# Patient Record
Sex: MALE | Race: WHITE | NOT HISPANIC OR LATINO | Employment: OTHER | URBAN - METROPOLITAN AREA
[De-identification: names, ages, dates, MRNs, and addresses within clinical notes are randomized per-mention and may not be internally consistent; named-entity substitution may affect disease eponyms.]

---

## 2023-02-24 LAB
ALBUMIN (G/DL) IN SER/PLAS: 4.2 G/DL (ref 3.4–5)
ANION GAP IN SER/PLAS: 15 MMOL/L (ref 10–20)
APPEARANCE, URINE: CLEAR
BILIRUBIN, URINE: NEGATIVE
BLOOD, URINE: ABNORMAL
CALCIUM (MG/DL) IN SER/PLAS: 9.1 MG/DL (ref 8.6–10.3)
CARBON DIOXIDE, TOTAL (MMOL/L) IN SER/PLAS: 25 MMOL/L (ref 21–32)
CHLORIDE (MMOL/L) IN SER/PLAS: 103 MMOL/L (ref 98–107)
COLOR, URINE: YELLOW
CREATININE (MG/DL) IN SER/PLAS: 2.72 MG/DL (ref 0.5–1.3)
GFR MALE: 24 ML/MIN/1.73M2
GLUCOSE (MG/DL) IN SER/PLAS: 118 MG/DL (ref 74–99)
GLUCOSE, URINE: ABNORMAL MG/DL
KETONES, URINE: NEGATIVE MG/DL
LEUKOCYTE ESTERASE, URINE: NEGATIVE
NITRITE, URINE: NEGATIVE
PH, URINE: 5 (ref 5–8)
PHOSPHATE (MG/DL) IN SER/PLAS: 3.6 MG/DL (ref 2.5–4.9)
POTASSIUM (MMOL/L) IN SER/PLAS: 4.4 MMOL/L (ref 3.5–5.3)
PROTEIN, URINE: ABNORMAL MG/DL
RBC, URINE: <1 /HPF (ref 0–5)
SODIUM (MMOL/L) IN SER/PLAS: 139 MMOL/L (ref 136–145)
SPECIFIC GRAVITY, URINE: 1.02 (ref 1–1.03)
UREA NITROGEN (MG/DL) IN SER/PLAS: 46 MG/DL (ref 6–23)
UROBILINOGEN, URINE: <2 MG/DL (ref 0–1.9)
WBC, URINE: <1 /HPF (ref 0–5)

## 2023-02-25 LAB
ESTIMATED AVERAGE GLUCOSE FOR HBA1C: 157 MG/DL
HEMOGLOBIN A1C/HEMOGLOBIN TOTAL IN BLOOD: 7.1 %
PARATHYRIN INTACT (PG/ML) IN SER/PLAS: 114.9 PG/ML (ref 18.5–88)

## 2023-03-08 ENCOUNTER — TELEPHONE (OUTPATIENT)
Dept: PRIMARY CARE | Facility: CLINIC | Age: 73
End: 2023-03-08
Payer: MEDICARE

## 2023-03-09 DIAGNOSIS — U07.1 COVID-19: Primary | ICD-10-CM

## 2023-03-09 RX ORDER — DRONEDARONE 400 MG/1
400 TABLET, FILM COATED ORAL
COMMUNITY
Start: 2022-05-19 | End: 2023-11-08 | Stop reason: WASHOUT

## 2023-03-09 RX ORDER — APIXABAN 5 MG/1
1 TABLET, FILM COATED ORAL 2 TIMES DAILY
COMMUNITY
Start: 2022-02-24 | End: 2024-02-27 | Stop reason: SDUPTHER

## 2023-03-09 RX ORDER — DULAGLUTIDE 1.5 MG/.5ML
1.5 INJECTION, SOLUTION SUBCUTANEOUS
COMMUNITY
Start: 2020-03-05 | End: 2023-12-01 | Stop reason: SDUPTHER

## 2023-03-09 RX ORDER — EZETIMIBE 10 MG/1
1 TABLET ORAL DAILY
COMMUNITY
Start: 2020-11-18

## 2023-03-09 RX ORDER — ASCORBIC ACID 250 MG
1 TABLET ORAL DAILY
COMMUNITY
End: 2023-10-13 | Stop reason: ALTCHOICE

## 2023-03-09 RX ORDER — DAPAGLIFLOZIN 10 MG/1
1 TABLET, FILM COATED ORAL DAILY
COMMUNITY
Start: 2021-07-13 | End: 2024-02-15 | Stop reason: SDUPTHER

## 2023-03-09 RX ORDER — PERPHENAZINE 16 MG
800 TABLET ORAL 2 TIMES DAILY
COMMUNITY
Start: 2019-05-13

## 2023-03-10 DIAGNOSIS — U07.1 COVID-19: ICD-10-CM

## 2023-03-10 RX ORDER — MOLNUPIRAVIR 200 MG/1
800 CAPSULE ORAL DAILY
Qty: 20 CAPSULE | Refills: 0 | Status: SHIPPED | OUTPATIENT
Start: 2023-03-10 | End: 2023-03-10 | Stop reason: SDUPTHER

## 2023-03-10 RX ORDER — MOLNUPIRAVIR 200 MG/1
800 CAPSULE ORAL 2 TIMES DAILY
Qty: 40 CAPSULE | Refills: 0 | Status: SHIPPED | OUTPATIENT
Start: 2023-03-10 | End: 2023-03-15

## 2023-03-10 NOTE — PROGRESS NOTES
Reports positive COVID-19 test on 3/8/2023, will issue prescription for lagevrio 800 mg daily x 5 days.

## 2023-04-05 PROBLEM — I10 BENIGN ESSENTIAL HYPERTENSION: Status: ACTIVE | Noted: 2023-04-05

## 2023-04-05 PROBLEM — E66.9 OBESITY (BMI 30-39.9): Status: ACTIVE | Noted: 2023-04-05

## 2023-04-05 PROBLEM — E66.01 MORBID OBESITY (MULTI): Status: ACTIVE | Noted: 2023-04-05

## 2023-04-05 PROBLEM — I50.32 CHRONIC DIASTOLIC HEART FAILURE, NYHA CLASS 2 (MULTI): Status: ACTIVE | Noted: 2023-04-05

## 2023-04-05 PROBLEM — E78.5 HYPERLIPEMIA: Status: ACTIVE | Noted: 2023-04-05

## 2023-04-05 PROBLEM — I87.8 VENOUS STASIS OF LOWER EXTREMITY: Status: ACTIVE | Noted: 2023-04-05

## 2023-04-05 PROBLEM — I25.10 ATHEROSCLEROSIS OF NATIVE CORONARY ARTERY OF NATIVE HEART WITHOUT ANGINA PECTORIS: Status: ACTIVE | Noted: 2023-04-05

## 2023-04-05 PROBLEM — N18.32 CHRONIC KIDNEY DISEASE (CKD) STAGE G3B/A1, MODERATELY DECREASED GLOMERULAR FILTRATION RATE (GFR) BETWEEN 30-44 ML/MIN/1.73 SQUARE METER AND ALBUMINURIA CREATININE RATIO LESS THAN 30 MG/G (CMS* (MULTI): Status: ACTIVE | Noted: 2023-04-05

## 2023-04-05 PROBLEM — E11.21 TYPE 2 DIABETES WITH NEPHROPATHY (MULTI): Status: ACTIVE | Noted: 2023-04-05

## 2023-04-05 PROBLEM — I51.89 RIGHT VENTRICULAR SYSTOLIC DYSFUNCTION: Status: ACTIVE | Noted: 2023-04-05

## 2023-04-05 PROBLEM — E11.9 DIABETES TYPE 2, CONTROLLED (MULTI): Status: ACTIVE | Noted: 2023-04-05

## 2023-04-05 PROBLEM — Z86.59 HISTORY OF DEPRESSION: Status: ACTIVE | Noted: 2023-04-05

## 2023-04-05 PROBLEM — G47.33 OSA TREATED WITH BIPAP: Status: ACTIVE | Noted: 2023-04-05

## 2023-04-05 PROBLEM — I48.0 PAROXYSMAL ATRIAL FIBRILLATION (MULTI): Status: ACTIVE | Noted: 2023-04-05

## 2023-04-05 RX ORDER — ROSUVASTATIN CALCIUM 40 MG/1
1 TABLET, COATED ORAL DAILY
COMMUNITY
Start: 2016-02-11 | End: 2023-11-05

## 2023-04-05 RX ORDER — LOSARTAN POTASSIUM 50 MG/1
25 TABLET ORAL DAILY
COMMUNITY
Start: 2019-04-02 | End: 2023-11-08 | Stop reason: WASHOUT

## 2023-04-05 RX ORDER — OMEGA-3-ACID ETHYL ESTERS 1 G/1
1 CAPSULE, LIQUID FILLED ORAL 2 TIMES DAILY
COMMUNITY
Start: 2022-02-24

## 2023-04-05 RX ORDER — TURMERIC ROOT EXTRACT 500 MG
TABLET ORAL
COMMUNITY

## 2023-04-05 RX ORDER — SODIUM BICARBONATE 650 MG/1
1300 TABLET ORAL 2 TIMES DAILY
COMMUNITY
Start: 2022-12-30 | End: 2023-12-06 | Stop reason: WASHOUT

## 2023-04-05 RX ORDER — FAMOTIDINE 20 MG/1
1 TABLET, FILM COATED ORAL EVERY 12 HOURS
COMMUNITY
Start: 2021-07-13

## 2023-04-05 RX ORDER — PEN NEEDLE, DIABETIC 31 GX5/16"
NEEDLE, DISPOSABLE MISCELLANEOUS
COMMUNITY
Start: 2022-11-26 | End: 2023-12-19 | Stop reason: SDUPTHER

## 2023-04-05 RX ORDER — GLIMEPIRIDE 4 MG/1
1 TABLET ORAL 2 TIMES DAILY
COMMUNITY
Start: 2015-07-29 | End: 2024-05-29 | Stop reason: SDUPTHER

## 2023-04-05 RX ORDER — ACETAMINOPHEN 160 MG/5ML
SUSPENSION, ORAL (FINAL DOSE FORM) ORAL
COMMUNITY
Start: 2019-04-02

## 2023-04-05 RX ORDER — INSULIN DEGLUDEC 100 U/ML
INJECTION, SOLUTION SUBCUTANEOUS
COMMUNITY
Start: 2019-10-25 | End: 2023-12-06 | Stop reason: ALTCHOICE

## 2023-04-05 RX ORDER — CEPHRADINE 500 MG
1 CAPSULE ORAL DAILY
COMMUNITY
Start: 2019-05-13

## 2023-04-05 RX ORDER — NITROGLYCERIN 0.4 MG/1
0.4 TABLET SUBLINGUAL EVERY 5 MIN PRN
COMMUNITY
Start: 2021-08-30

## 2023-04-05 RX ORDER — GLUC/MSM/COLGN2/HYAL/ANTIARTH3 375-375-20
1 TABLET ORAL DAILY
COMMUNITY

## 2023-04-05 RX ORDER — FUROSEMIDE 20 MG/1
20 TABLET ORAL
COMMUNITY
Start: 2018-10-29 | End: 2023-10-13 | Stop reason: ALTCHOICE

## 2023-04-06 ENCOUNTER — OFFICE VISIT (OUTPATIENT)
Dept: PRIMARY CARE | Facility: CLINIC | Age: 73
End: 2023-04-06
Payer: MEDICARE

## 2023-04-06 VITALS
TEMPERATURE: 97.1 F | SYSTOLIC BLOOD PRESSURE: 160 MMHG | DIASTOLIC BLOOD PRESSURE: 87 MMHG | WEIGHT: 257 LBS | OXYGEN SATURATION: 97 % | HEIGHT: 70 IN | BODY MASS INDEX: 36.79 KG/M2 | HEART RATE: 64 BPM

## 2023-04-06 DIAGNOSIS — I10 BENIGN ESSENTIAL HYPERTENSION: Primary | ICD-10-CM

## 2023-04-06 DIAGNOSIS — Z79.4 CONTROLLED TYPE 2 DIABETES MELLITUS WITHOUT COMPLICATION, WITH LONG-TERM CURRENT USE OF INSULIN (MULTI): ICD-10-CM

## 2023-04-06 DIAGNOSIS — E11.9 CONTROLLED TYPE 2 DIABETES MELLITUS WITHOUT COMPLICATION, WITH LONG-TERM CURRENT USE OF INSULIN (MULTI): ICD-10-CM

## 2023-04-06 DIAGNOSIS — E78.2 MIXED HYPERLIPIDEMIA: ICD-10-CM

## 2023-04-06 PROCEDURE — 3077F SYST BP >= 140 MM HG: CPT | Performed by: STUDENT IN AN ORGANIZED HEALTH CARE EDUCATION/TRAINING PROGRAM

## 2023-04-06 PROCEDURE — 99212 OFFICE O/P EST SF 10 MIN: CPT | Performed by: STUDENT IN AN ORGANIZED HEALTH CARE EDUCATION/TRAINING PROGRAM

## 2023-04-06 PROCEDURE — 1160F RVW MEDS BY RX/DR IN RCRD: CPT | Performed by: STUDENT IN AN ORGANIZED HEALTH CARE EDUCATION/TRAINING PROGRAM

## 2023-04-06 PROCEDURE — 3079F DIAST BP 80-89 MM HG: CPT | Performed by: STUDENT IN AN ORGANIZED HEALTH CARE EDUCATION/TRAINING PROGRAM

## 2023-04-06 PROCEDURE — 1159F MED LIST DOCD IN RCRD: CPT | Performed by: STUDENT IN AN ORGANIZED HEALTH CARE EDUCATION/TRAINING PROGRAM

## 2023-04-06 PROCEDURE — 3051F HG A1C>EQUAL 7.0%<8.0%: CPT | Performed by: STUDENT IN AN ORGANIZED HEALTH CARE EDUCATION/TRAINING PROGRAM

## 2023-04-06 PROCEDURE — 4010F ACE/ARB THERAPY RXD/TAKEN: CPT | Performed by: STUDENT IN AN ORGANIZED HEALTH CARE EDUCATION/TRAINING PROGRAM

## 2023-04-06 ASSESSMENT — ENCOUNTER SYMPTOMS
WHEEZING: 0
VOMITING: 0
DIARRHEA: 0
CONFUSION: 0
CONSTIPATION: 0
CHILLS: 0
UNEXPECTED WEIGHT CHANGE: 0
FATIGUE: 0
COUGH: 0
PALPITATIONS: 0
MUSCULOSKELETAL NEGATIVE: 1
HEADACHES: 0
SHORTNESS OF BREATH: 0
DIZZINESS: 0
ABDOMINAL PAIN: 0
FEVER: 0
NAUSEA: 0
COLOR CHANGE: 0

## 2023-04-06 ASSESSMENT — PATIENT HEALTH QUESTIONNAIRE - PHQ9
SUM OF ALL RESPONSES TO PHQ9 QUESTIONS 1 AND 2: 0
2. FEELING DOWN, DEPRESSED OR HOPELESS: NOT AT ALL
1. LITTLE INTEREST OR PLEASURE IN DOING THINGS: NOT AT ALL

## 2023-04-06 ASSESSMENT — PAIN SCALES - GENERAL: PAINLEVEL: 0-NO PAIN

## 2023-04-06 NOTE — PROGRESS NOTES
"Subjective   Patient ID: Waldemar Trujillo is a 72 y.o. male who presents for New Patient Visit (Pt is here for NPV, used to see , would like to continue care. Pt has no other concern today.).    HPI   Here to estb care and also for chronic care FU. Reports he is doing okay, no acute concerns today; he is not sure which meds he gets from us and if he needs any refills today.   His home BG running from . He is on trulicity, tresiba & glimepiride, currently managed by endo. Also sees cards for pAF and others. Also follows w/ nephro for CKD. He gtes most of the meds from other specialists and is UTD on them. He presented w/ elevated BP today, reports prev at goal; takes losartan 50 mg managed by cards.     Review of Systems   Constitutional:  Negative for chills, fatigue, fever and unexpected weight change.   HENT: Negative.     Respiratory:  Negative for cough, shortness of breath and wheezing.    Cardiovascular:  Negative for chest pain, palpitations and leg swelling.   Gastrointestinal:  Negative for abdominal pain, constipation, diarrhea, nausea and vomiting.   Musculoskeletal: Negative.    Skin:  Negative for color change and rash.   Neurological:  Negative for dizziness and headaches.   Psychiatric/Behavioral:  Negative for behavioral problems and confusion.        Objective   /87 (BP Location: Left arm, Patient Position: Sitting)   Pulse 64   Temp 36.2 °C (97.1 °F)   Ht 1.778 m (5' 10\")   Wt 117 kg (257 lb)   SpO2 97%   BMI 36.88 kg/m²     Physical Exam  Vitals and nursing note reviewed.   Constitutional:       Appearance: Normal appearance. He is obese.   Cardiovascular:      Rate and Rhythm: Normal rate and regular rhythm.      Pulses: Normal pulses.      Heart sounds: Normal heart sounds.   Pulmonary:      Effort: Pulmonary effort is normal. No respiratory distress.      Breath sounds: Normal breath sounds.   Abdominal:      General: Abdomen is flat. Bowel sounds are normal.      " Palpations: Abdomen is soft.   Musculoskeletal:         General: Normal range of motion.   Neurological:      General: No focal deficit present.      Mental Status: He is alert and oriented to person, place, and time.   Psychiatric:         Mood and Affect: Mood normal.         Behavior: Behavior normal.       Assessment/Plan   He is here to estb care and also for FU visit. Overall doing okay, no major concerns today and is clinically & vitally stable except elevated BP. Rec to keep BP logs and call us with readings in 2 wks. Declined to come for nurse visit for BP check. Advise to get in touch w/ cards for further mgmt. Rec dash diety and daily exercises. Rec to d/ diabetic meds w/ endo at NOV; may benefit from stopping or cutting down on glimepiride d/t low BG. Advise to cont seeing all other specialist as usual. Okay to cont seeing BSP if he comes back later to the practice.  Plan as follows     Problem List Items Addressed This Visit          Circulatory    Benign essential hypertension - Primary       Endocrine/Metabolic    Diabetes type 2, controlled (CMS/HCC)       Other    Hyperlipemia     Mariano Landers MD    [place]

## 2023-06-01 LAB
ALBUMIN (G/DL) IN SER/PLAS: 4.2 G/DL (ref 3.4–5)
ANION GAP IN SER/PLAS: 11 MMOL/L (ref 10–20)
APPEARANCE, URINE: CLEAR
BILIRUBIN, URINE: NEGATIVE
BLOOD, URINE: ABNORMAL
CALCIDIOL (25 OH VITAMIN D3) (NG/ML) IN SER/PLAS: 93 NG/ML
CALCIUM (MG/DL) IN SER/PLAS: 9.2 MG/DL (ref 8.6–10.3)
CARBON DIOXIDE, TOTAL (MMOL/L) IN SER/PLAS: 24 MMOL/L (ref 21–32)
CHLORIDE (MMOL/L) IN SER/PLAS: 109 MMOL/L (ref 98–107)
COLOR, URINE: YELLOW
CREATININE (MG/DL) IN SER/PLAS: 1.89 MG/DL (ref 0.5–1.3)
CREATININE (MG/DL) IN URINE: 73 MG/DL (ref 20–370)
FINE GRANULAR CASTS, URINE: ABNORMAL /LPF
GFR MALE: 37 ML/MIN/1.73M2
GLUCOSE (MG/DL) IN SER/PLAS: 108 MG/DL (ref 74–99)
GLUCOSE, URINE: ABNORMAL MG/DL
KETONES, URINE: NEGATIVE MG/DL
LEUKOCYTE ESTERASE, URINE: NEGATIVE
NITRITE, URINE: NEGATIVE
PARATHYRIN INTACT (PG/ML) IN SER/PLAS: 76.1 PG/ML (ref 18.5–88)
PH, URINE: 5 (ref 5–8)
PHOSPHATE (MG/DL) IN SER/PLAS: 4 MG/DL (ref 2.5–4.9)
POTASSIUM (MMOL/L) IN SER/PLAS: 4.4 MMOL/L (ref 3.5–5.3)
PROTEIN (MG/DL) IN URINE: 91 MG/DL (ref 5–25)
PROTEIN, URINE: ABNORMAL MG/DL
PROTEIN/CREATININE (MG/MG) IN URINE: 1.25 MG/MG CREAT (ref 0–0.17)
RBC, URINE: <1 /HPF (ref 0–5)
SODIUM (MMOL/L) IN SER/PLAS: 140 MMOL/L (ref 136–145)
SPECIFIC GRAVITY, URINE: 1.02 (ref 1–1.03)
SQUAMOUS EPITHELIAL CELLS, URINE: 1 /HPF
UREA NITROGEN (MG/DL) IN SER/PLAS: 30 MG/DL (ref 6–23)
UROBILINOGEN, URINE: <2 MG/DL (ref 0–1.9)
WBC, URINE: 1 /HPF (ref 0–5)

## 2023-10-06 PROBLEM — U07.1 POSITIVE SELF-ADMINISTERED ANTIGEN TEST FOR COVID-19: Status: ACTIVE | Noted: 2023-10-06

## 2023-10-06 RX ORDER — FUROSEMIDE 40 MG/1
40 TABLET ORAL DAILY
COMMUNITY
Start: 2018-10-29

## 2023-10-06 RX ORDER — MOLNUPIRAVIR 200 MG/1
CAPSULE ORAL 2 TIMES DAILY
COMMUNITY
Start: 2023-03-09 | End: 2023-11-08 | Stop reason: WASHOUT

## 2023-10-06 RX ORDER — PIOGLITAZONEHYDROCHLORIDE 45 MG/1
45 TABLET ORAL DAILY
COMMUNITY
Start: 2018-08-17 | End: 2023-11-08 | Stop reason: WASHOUT

## 2023-10-06 RX ORDER — HYDRALAZINE HYDROCHLORIDE 25 MG/1
25 TABLET, FILM COATED ORAL EVERY 12 HOURS
COMMUNITY
Start: 2019-05-13 | End: 2023-11-08 | Stop reason: WASHOUT

## 2023-10-06 RX ORDER — ASPIRIN 81 MG/1
81 TABLET ORAL DAILY
COMMUNITY
Start: 2016-09-12 | End: 2023-10-09 | Stop reason: ALTCHOICE

## 2023-10-06 RX ORDER — WARFARIN 1 MG/1
1 TABLET ORAL
COMMUNITY
End: 2023-10-09 | Stop reason: ALTCHOICE

## 2023-10-06 RX ORDER — SOTALOL HYDROCHLORIDE 80 MG/1
80 TABLET ORAL EVERY 12 HOURS
COMMUNITY
Start: 2018-08-17 | End: 2023-11-08 | Stop reason: WASHOUT

## 2023-10-06 RX ORDER — AMLODIPINE BESYLATE 5 MG/1
5 TABLET ORAL
COMMUNITY
Start: 2018-09-22 | End: 2023-10-13 | Stop reason: ALTCHOICE

## 2023-10-06 RX ORDER — MOMETASONE FUROATE 50 UG/1
2 SPRAY, METERED NASAL EVERY 24 HOURS
COMMUNITY
Start: 2019-11-09 | End: 2023-12-06 | Stop reason: WASHOUT

## 2023-10-06 RX ORDER — LIRAGLUTIDE 6 MG/ML
INJECTION SUBCUTANEOUS DAILY
COMMUNITY
Start: 2018-09-07 | End: 2023-10-13 | Stop reason: ALTCHOICE

## 2023-10-06 RX ORDER — INSULIN GLARGINE 300 U/ML
INJECTION, SOLUTION SUBCUTANEOUS
COMMUNITY
Start: 2023-08-02

## 2023-10-06 RX ORDER — CHLORTHALIDONE 25 MG/1
25 TABLET ORAL
COMMUNITY
Start: 2018-07-02 | End: 2023-11-08 | Stop reason: WASHOUT

## 2023-10-06 RX ORDER — RAMIPRIL 2.5 MG/1
2.5 CAPSULE ORAL
COMMUNITY
Start: 2018-08-10 | End: 2023-10-13 | Stop reason: ALTCHOICE

## 2023-10-09 ENCOUNTER — OFFICE VISIT (OUTPATIENT)
Dept: PRIMARY CARE | Facility: CLINIC | Age: 73
End: 2023-10-09
Payer: MEDICARE

## 2023-10-09 ENCOUNTER — LAB (OUTPATIENT)
Dept: LAB | Facility: LAB | Age: 73
End: 2023-10-09
Payer: MEDICARE

## 2023-10-09 VITALS
OXYGEN SATURATION: 97 % | TEMPERATURE: 97.4 F | DIASTOLIC BLOOD PRESSURE: 85 MMHG | WEIGHT: 256 LBS | HEART RATE: 69 BPM | SYSTOLIC BLOOD PRESSURE: 192 MMHG | HEIGHT: 70 IN | BODY MASS INDEX: 36.65 KG/M2 | RESPIRATION RATE: 16 BRPM

## 2023-10-09 DIAGNOSIS — I10 BENIGN ESSENTIAL HYPERTENSION: ICD-10-CM

## 2023-10-09 DIAGNOSIS — E66.01 MORBID OBESITY (MULTI): ICD-10-CM

## 2023-10-09 DIAGNOSIS — Z12.5 SCREENING FOR PROSTATE CANCER: ICD-10-CM

## 2023-10-09 DIAGNOSIS — I48.0 PAROXYSMAL ATRIAL FIBRILLATION (MULTI): ICD-10-CM

## 2023-10-09 DIAGNOSIS — Z00.00 ROUTINE GENERAL MEDICAL EXAMINATION AT HEALTH CARE FACILITY: Primary | ICD-10-CM

## 2023-10-09 DIAGNOSIS — E11.9 TYPE 2 DIABETES MELLITUS WITHOUT COMPLICATIONS (MULTI): ICD-10-CM

## 2023-10-09 DIAGNOSIS — N18.4 CHRONIC KIDNEY DISEASE, STAGE 4 (SEVERE) (MULTI): Primary | ICD-10-CM

## 2023-10-09 LAB
25(OH)D3 SERPL-MCNC: 95 NG/ML (ref 30–100)
ALBUMIN SERPL BCP-MCNC: 4.6 G/DL (ref 3.4–5)
ANION GAP SERPL CALC-SCNC: 13 MMOL/L (ref 10–20)
ANION GAP SERPL CALC-SCNC: 15 MMOL/L (ref 10–20)
APPEARANCE UR: CLEAR
BILIRUB UR STRIP.AUTO-MCNC: NEGATIVE MG/DL
BUN SERPL-MCNC: 31 MG/DL (ref 6–23)
BUN SERPL-MCNC: 32 MG/DL (ref 6–23)
CALCIUM SERPL-MCNC: 9.4 MG/DL (ref 8.6–10.3)
CALCIUM SERPL-MCNC: 9.4 MG/DL (ref 8.6–10.3)
CHLORIDE SERPL-SCNC: 109 MMOL/L (ref 98–107)
CHLORIDE SERPL-SCNC: 110 MMOL/L (ref 98–107)
CHOLEST SERPL-MCNC: 111 MG/DL (ref 0–199)
CHOLESTEROL/HDL RATIO: 2.7
CO2 SERPL-SCNC: 23 MMOL/L (ref 21–32)
CO2 SERPL-SCNC: 23 MMOL/L (ref 21–32)
COLOR UR: YELLOW
CREAT SERPL-MCNC: 1.97 MG/DL (ref 0.5–1.3)
CREAT SERPL-MCNC: 2.04 MG/DL (ref 0.5–1.3)
CREAT UR-MCNC: 79.3 MG/DL (ref 20–370)
GFR SERPL CREATININE-BSD FRML MDRD: 34 ML/MIN/1.73M*2
GFR SERPL CREATININE-BSD FRML MDRD: 35 ML/MIN/1.73M*2
GLUCOSE SERPL-MCNC: 78 MG/DL (ref 74–99)
GLUCOSE SERPL-MCNC: 80 MG/DL (ref 74–99)
GLUCOSE UR STRIP.AUTO-MCNC: ABNORMAL MG/DL
HDLC SERPL-MCNC: 40.7 MG/DL
KETONES UR STRIP.AUTO-MCNC: NEGATIVE MG/DL
LDLC SERPL CALC-MCNC: 54 MG/DL (ref 140–190)
LEUKOCYTE ESTERASE UR QL STRIP.AUTO: NEGATIVE
MICROALBUMIN UR-MCNC: 816.8 MG/L
MICROALBUMIN/CREAT UR: 1030 UG/MG CREAT
MUCOUS THREADS #/AREA URNS AUTO: ABNORMAL /LPF
NITRITE UR QL STRIP.AUTO: NEGATIVE
NON HDL CHOLESTEROL: 70 MG/DL (ref 0–149)
PH UR STRIP.AUTO: 5 [PH]
PHOSPHATE SERPL-MCNC: 3.7 MG/DL (ref 2.5–4.9)
POTASSIUM SERPL-SCNC: 4.6 MMOL/L (ref 3.5–5.3)
POTASSIUM SERPL-SCNC: 4.7 MMOL/L (ref 3.5–5.3)
PROT UR STRIP.AUTO-MCNC: ABNORMAL MG/DL
RBC # UR STRIP.AUTO: ABNORMAL /UL
RBC #/AREA URNS AUTO: ABNORMAL /HPF
SODIUM SERPL-SCNC: 141 MMOL/L (ref 136–145)
SODIUM SERPL-SCNC: 142 MMOL/L (ref 136–145)
SP GR UR STRIP.AUTO: 1.02
TRIGL SERPL-MCNC: 80 MG/DL (ref 0–149)
UROBILINOGEN UR STRIP.AUTO-MCNC: <2 MG/DL
VLDL: 16 MG/DL (ref 0–40)
WBC #/AREA URNS AUTO: ABNORMAL /HPF

## 2023-10-09 PROCEDURE — 4010F ACE/ARB THERAPY RXD/TAKEN: CPT | Performed by: STUDENT IN AN ORGANIZED HEALTH CARE EDUCATION/TRAINING PROGRAM

## 2023-10-09 PROCEDURE — 1170F FXNL STATUS ASSESSED: CPT | Performed by: STUDENT IN AN ORGANIZED HEALTH CARE EDUCATION/TRAINING PROGRAM

## 2023-10-09 PROCEDURE — 80069 RENAL FUNCTION PANEL: CPT

## 2023-10-09 PROCEDURE — 80061 LIPID PANEL: CPT

## 2023-10-09 PROCEDURE — 3077F SYST BP >= 140 MM HG: CPT | Performed by: STUDENT IN AN ORGANIZED HEALTH CARE EDUCATION/TRAINING PROGRAM

## 2023-10-09 PROCEDURE — 99213 OFFICE O/P EST LOW 20 MIN: CPT | Performed by: STUDENT IN AN ORGANIZED HEALTH CARE EDUCATION/TRAINING PROGRAM

## 2023-10-09 PROCEDURE — 1159F MED LIST DOCD IN RCRD: CPT | Performed by: STUDENT IN AN ORGANIZED HEALTH CARE EDUCATION/TRAINING PROGRAM

## 2023-10-09 PROCEDURE — 81001 URINALYSIS AUTO W/SCOPE: CPT

## 2023-10-09 PROCEDURE — 1126F AMNT PAIN NOTED NONE PRSNT: CPT | Performed by: STUDENT IN AN ORGANIZED HEALTH CARE EDUCATION/TRAINING PROGRAM

## 2023-10-09 PROCEDURE — 80048 BASIC METABOLIC PNL TOTAL CA: CPT

## 2023-10-09 PROCEDURE — 36415 COLL VENOUS BLD VENIPUNCTURE: CPT

## 2023-10-09 PROCEDURE — G0439 PPPS, SUBSEQ VISIT: HCPCS | Performed by: STUDENT IN AN ORGANIZED HEALTH CARE EDUCATION/TRAINING PROGRAM

## 2023-10-09 PROCEDURE — 82570 ASSAY OF URINE CREATININE: CPT

## 2023-10-09 PROCEDURE — 83036 HEMOGLOBIN GLYCOSYLATED A1C: CPT

## 2023-10-09 PROCEDURE — 82043 UR ALBUMIN QUANTITATIVE: CPT

## 2023-10-09 PROCEDURE — 3051F HG A1C>EQUAL 7.0%<8.0%: CPT | Performed by: STUDENT IN AN ORGANIZED HEALTH CARE EDUCATION/TRAINING PROGRAM

## 2023-10-09 PROCEDURE — 1160F RVW MEDS BY RX/DR IN RCRD: CPT | Performed by: STUDENT IN AN ORGANIZED HEALTH CARE EDUCATION/TRAINING PROGRAM

## 2023-10-09 PROCEDURE — 3079F DIAST BP 80-89 MM HG: CPT | Performed by: STUDENT IN AN ORGANIZED HEALTH CARE EDUCATION/TRAINING PROGRAM

## 2023-10-09 PROCEDURE — 82306 VITAMIN D 25 HYDROXY: CPT

## 2023-10-09 SDOH — ECONOMIC STABILITY: FOOD INSECURITY: WITHIN THE PAST 12 MONTHS, YOU WORRIED THAT YOUR FOOD WOULD RUN OUT BEFORE YOU GOT MONEY TO BUY MORE.: NEVER TRUE

## 2023-10-09 SDOH — ECONOMIC STABILITY: FOOD INSECURITY: WITHIN THE PAST 12 MONTHS, THE FOOD YOU BOUGHT JUST DIDN'T LAST AND YOU DIDN'T HAVE MONEY TO GET MORE.: NEVER TRUE

## 2023-10-09 ASSESSMENT — ENCOUNTER SYMPTOMS
CHILLS: 0
PALPITATIONS: 0
UNEXPECTED WEIGHT CHANGE: 0
LOSS OF SENSATION IN FEET: 0
FATIGUE: 0
VOMITING: 0
COUGH: 0
OCCASIONAL FEELINGS OF UNSTEADINESS: 0
SHORTNESS OF BREATH: 0
CONSTIPATION: 0
MUSCULOSKELETAL NEGATIVE: 1
WHEEZING: 0
COLOR CHANGE: 0
HEADACHES: 0
DIARRHEA: 0
DIZZINESS: 0
NAUSEA: 0
DEPRESSION: 0
FEVER: 0
ABDOMINAL PAIN: 0
CONFUSION: 0

## 2023-10-09 ASSESSMENT — ACTIVITIES OF DAILY LIVING (ADL)
DRESSING: INDEPENDENT
BATHING: INDEPENDENT
MANAGING_FINANCES: INDEPENDENT
DOING_HOUSEWORK: INDEPENDENT
GROCERY_SHOPPING: INDEPENDENT
TAKING_MEDICATION: INDEPENDENT

## 2023-10-09 ASSESSMENT — PATIENT HEALTH QUESTIONNAIRE - PHQ9
SUM OF ALL RESPONSES TO PHQ9 QUESTIONS 1 & 2: 0
1. LITTLE INTEREST OR PLEASURE IN DOING THINGS: NOT AT ALL
2. FEELING DOWN, DEPRESSED OR HOPELESS: NOT AT ALL

## 2023-10-09 ASSESSMENT — LIFESTYLE VARIABLES
HOW OFTEN DO YOU HAVE SIX OR MORE DRINKS ON ONE OCCASION: NEVER
HOW OFTEN DO YOU HAVE A DRINK CONTAINING ALCOHOL: MONTHLY OR LESS
AUDIT-C TOTAL SCORE: 1
SKIP TO QUESTIONS 9-10: 1
HOW MANY STANDARD DRINKS CONTAINING ALCOHOL DO YOU HAVE ON A TYPICAL DAY: 1 OR 2

## 2023-10-09 NOTE — PROGRESS NOTES
"Subjective   Patient ID: Waldemar Trujillo is a 73 y.o. male who presents for Medicare Annual Wellness Visit Subsequent and Follow-up (No new health concerns).  Reports currently he is having some dental issues & pain otherwise doing okay.       Past Medical, Surgical, and Family History reviewed and updated in chart.    Reviewed all medications by prescribing practitioner or clinical pharmacist (such as prescriptions, OTCs, herbal therapies and supplements) and documented in the medical record.    HPI  #HM stuffs  Screening tests:  - Colonoscopy (age 45-75): 03/2020; rec to repeat in 5 yrs (after 03/2025).   - PSA (age 50 and older): 08/22; wnl.   - Lipid profile: UTD, at goal.     Primary prevention:  - Flu shot: UTD   - RSV (age>60 yrs): rec to get soon   - COVID vaccines: UTD   - Tdap shot: declined for now   - Shingles shot (age >50): rec to get at local pharmacy   - Prevnar 20: UTD   - Statin (age 40-65 or high risk): taking     Counseling:   - ETOH (age>18): very occa   - Smoking: none     Patient Care Team:  Mariano Landers MD as PCP - General (Family Medicine)  Damian Rivera DO as PCP - [employer] PCP     Review of Systems   Constitutional:  Negative for chills, fatigue, fever and unexpected weight change.   HENT: Negative.     Respiratory:  Negative for cough, shortness of breath and wheezing.    Cardiovascular:  Negative for chest pain, palpitations and leg swelling.   Gastrointestinal:  Negative for abdominal pain, constipation, diarrhea, nausea and vomiting.   Musculoskeletal: Negative.    Skin:  Negative for color change and rash.   Neurological:  Negative for dizziness and headaches.   Psychiatric/Behavioral:  Negative for behavioral problems and confusion.        Objective   Vitals:  BP (!) 192/85   Pulse 69   Temp 36.3 °C (97.4 °F)   Resp 16   Ht 1.778 m (5' 10\")   Wt 116 kg (256 lb)   SpO2 97%   BMI 36.73 kg/m²       Physical Exam  Vitals and nursing note reviewed. "   Constitutional:       Appearance: Normal appearance. He is obese.   HENT:      Right Ear: Tympanic membrane normal.      Left Ear: Tympanic membrane normal.   Eyes:      Extraocular Movements: Extraocular movements intact.      Pupils: Pupils are equal, round, and reactive to light.   Cardiovascular:      Rate and Rhythm: Normal rate and regular rhythm.      Pulses: Normal pulses.      Heart sounds: Normal heart sounds.   Pulmonary:      Effort: Pulmonary effort is normal. No respiratory distress.      Breath sounds: Normal breath sounds.   Abdominal:      General: Abdomen is flat. Bowel sounds are normal.      Palpations: Abdomen is soft.   Musculoskeletal:         General: Normal range of motion.   Neurological:      General: No focal deficit present.      Mental Status: He is alert and oriented to person, place, and time.      Cranial Nerves: No cranial nerve deficit.      Sensory: No sensory deficit.      Motor: No weakness.   Psychiatric:         Mood and Affect: Mood normal.         Behavior: Behavior normal.       Assessment/Plan   He is here for [facility]. Overall doing okay, no major concerns today and is clinically & vitally stable except elevated BP.  He presented w/ elevated BP, ~180-190/85 likely from tooth extraction & pain associated with from this am as he is in lot of pain currently; also his preb BP been close to goals. Adv to get in touch with cards/nephro for sooner appt for further mgmt; if starts having HA/CP and other concerning sx, seek ER care right away. Pt verbalized understanding & agree with the plan.      # [facility] wellness # HM visit   - will work on POA/living will paper work   - UTD on immunization per emr; rec to get shingles & RSV soon   - UTD on age appropriate screenings as listed above in [facility] summary; will get PSA with his other BW from cards/nephro.   - refer [facility] summary above for detail  - BW ordered as listed in emr     Problem List Items Addressed This Visit       Benign essential  hypertension    Current Assessment & Plan     Managed by cards/nephro; Adv to see them soon.          Paroxysmal atrial fibrillation (CMS/HCC)    Current Assessment & Plan     Stable; on AC & BB; following with cardiology.          Morbid obesity (CMS/HCC)    Current Assessment & Plan     Adv to work on portion size control and daily exercises as tolerated.           Other Visit Diagnoses       Routine general medical examination at health care facility    -  Primary    Screening for prostate cancer        Relevant Orders    Prostate Specific Antigen, Screen          Rtc 12 mo for MCR    Mariano Landers MD    [place]

## 2023-10-10 LAB
EST. AVERAGE GLUCOSE BLD GHB EST-MCNC: 160 MG/DL
HBA1C MFR BLD: 7.2 %

## 2023-10-11 ENCOUNTER — TELEPHONE (OUTPATIENT)
Dept: CARDIOLOGY | Facility: CLINIC | Age: 73
End: 2023-10-11
Payer: MEDICARE

## 2023-10-11 ENCOUNTER — TELEPHONE (OUTPATIENT)
Dept: ENDOCRINOLOGY | Facility: CLINIC | Age: 73
End: 2023-10-11
Payer: MEDICARE

## 2023-10-11 NOTE — TELEPHONE ENCOUNTER
"Patient left multiple voice mails today reporting elevated blood pressure.  He saw his PCP on Monday and had elevated readings at that visit.      Dr. Landers documented:    \"He presented w/ elevated BP, ~180-190/85 likely from tooth extraction & pain associated with from this am as he is in lot of pain currently; also his preb BP been close to goals. Adv to get in touch with cards/nephro for sooner appt for further mgmt; if starts having HA/CP and other concerning sx, seek ER care right away. Pt verbalized understanding & agree with the plan.\"     I called him back.  He had one reading early today of 213/110.  He tells me he is asymptomatic.  He denies having pain of any kind or any neurological symptoms.  Reviewed his medications.  The only antihypertensive he is taking is losartan 25 mg daily given to him by Dr. Perez.  His medication list in not accurate.      Plan:  I asked him to make a perfect list of what medications he is actually taking so his medication list could be updated.  I asked him to call Dr. Perez's office as well to report his BP readings.  We will get him in on Friday with Joyce Linares CNP for a visit.  He can bring his home BP monitor and check his BP here after we check it to see how accurate him monitor is.  He will go to ER to be evaluated if necessary if urgent symptoms develop prior to his appointment:  Chest pain or Signs of stroke: (1) Sudden numbness or weakness, especially on one side of the body, (2) Sudden confusion, trouble speaking or understanding speech, (3) Sudden trouble seeing in one or both eyes, (4) Sudden trouble walking, dizziness, or loss of balance, (5) Sudden severe headache. If you experience any of these symptoms, This would be an emergency.  CALL 911 and seek immediate medical attention.  He verbalized understanding and is agreeable.  "

## 2023-10-12 NOTE — PROGRESS NOTES
"Primary Cardiologist: Dr. Nassar    Chief Complaint:   Atrial Fibrillation, Heart Failure, and Hypertension     History Of Present Illness:    Waldemar Trujillo is a 73 y.o. male with past medical history of chronic diastolic heart failure, coronary artery disease status post bypass done in 2000, paroxysmal atrial fibrillation, antiarrhythmic drug therapy with sotalol (CHANGED TO MULTAQ in 2022 due to worsening renal function), warfarin anticoagulation(now on a DOAC), chronic kidney disease, hypertension, dyslipidemia, diabetes mellitus, and obesity who presents for follow up for hypertension.       Today, Waldemar Trujillo is feeling well, has no cardiac concerns but states that over the last week his blood pressures have been high, running with systolics 180-200 mmHg. Today, blood pressure at visit is well controlled. Patient did not bring a list of home medications today and is unsure of what he is taking.     Last Recorded Vitals:  Visit Vitals  /70   Pulse 61   Ht 1.778 m (5' 10\")   Wt 131 kg (288 lb)   BMI 41.32 kg/m²   Smoking Status Former   BSA 2.54 m²        Past Medical History:  He has a past medical history of Activated protein C resistance (CMS/[facility]), Acute diastolic (congestive) heart failure (CMS/[facility]) (10/29/2018), Atherosclerosis of native coronary artery of native heart without angina pectoris (04/05/2023), Benign essential hypertension (04/05/2023), Benign prostatic hyperplasia with lower urinary tract symptoms (11/15/2019), Chronic diastolic heart failure, NYHA class 2 (CMS/[facility]) (04/05/2023), Chronic kidney disease (CKD) stage G3b/A1, moderately decreased glomerular filtration rate (GFR) between 30-44 mL/min/1.73 square meter and albuminuria creatinine ratio less than 30 mg/g (CMS/[facility]) (04/05/2023), Diabetes type 2, controlled (CMS/[facility]) (04/05/2023), History of depression (04/05/2023), Hyperlipemia (04/05/2023), Morbid obesity (CMS/[facility]) (04/05/2023), Obesity (BMI 30-39.9) (04/05/2023), KENNEY " treated with BiPAP (04/05/2023), Paroxysmal atrial fibrillation (CMS/HCC) (04/05/2023), Personal history of Methicillin resistant Staphylococcus aureus infection, Personal history of other diseases of male genital organs (11/15/2019), Personal history of other diseases of the musculoskeletal system and connective tissue (11/06/2019), Personal history of other diseases of the musculoskeletal system and connective tissue, Personal history of other diseases of the musculoskeletal system and connective tissue, Personal history of other diseases of the nervous system and sense organs, Personal history of other diseases of the nervous system and sense organs, Personal history of other diseases of the respiratory system (11/15/2019), Personal history of other endocrine, nutritional and metabolic disease (11/15/2019), Personal history of other endocrine, nutritional and metabolic disease (11/15/2019), Personal history of urinary calculi, Right ventricular systolic dysfunction (04/05/2023), Type 2 diabetes mellitus without complications (CMS/[facility]) (12/02/2019), Type 2 diabetes with nephropathy (CMS/[facility]) (04/05/2023), and Venous stasis of lower extremity (04/05/2023).    Past Surgical History:  He has a past surgical history that includes Total hip arthroplasty (10/06/2016); Coronary artery bypass graft (10/06/2016); Cataract extraction (08/28/2017); Other surgical history (10/29/2018); Other surgical history (11/09/2019); Other surgical history (11/09/2019); Other surgical history (11/09/2019); and [place] tooth extraction.      Social History:  He reports that he has quit smoking. His smoking use included cigarettes. He does not have any smokeless tobacco history on file. He reports that he does not currently use alcohol. He reports current drug use. Frequency: 1.00 time per week. Drug: Marijuana.    Family History:  Family History   Problem Relation Name Age of Onset    Coronary artery disease Mother      Diabetes Mother    "   Coronary artery disease Father      Diabetes Father          Allergies:  Ace inhibitors and Atorvastatin    Outpatient Medications:  Current Outpatient Medications   Medication Instructions    alpha lipoic acid 800 mg, oral, 2 times daily    BD Ultra-Fine Mini Pen Needle 31 gauge x 3/16\" needle     chlorthalidone (HYGROTON) 25 mg, oral    coenzyme Q-10 200 mg capsule oral    Eliquis 5 mg tablet 1 tablet, oral, 2 times daily    ezetimibe (Zetia) 10 mg tablet 1 tablet, oral, Daily    famotidine (Pepcid) 20 mg tablet 1 tablet, oral, Every 12 hours    Farxiga 10 mg 1 tablet, oral, Daily    ferrous gluconate 236 mg (27 mg iron) tablet 1 tablet, oral, Daily    fish oil concentrate (Omega-3) 120-180 mg capsule 1 g, oral, Daily    furosemide (LASIX) 40 mg, oral, Daily    glimepiride (Amaryl) 4 mg tablet 1 tablet, oral, 2 times daily    hydrALAZINE (APRESOLINE) 25 mg, oral, Every 12 hours    insulin glargine (Toujeo Max U-300 SoloStar) 300 unit/mL (3 mL) injection subcutaneous    losartan (COZAAR) 25 mg, oral, Daily, Patient reports he decreased dose on advice from kidney doctor    molnupiravir (Lagevrio, EUA,) 200 mg capsule oral, 2 times daily    mometasone (Nasonex) 50 mcg/actuation nasal spray 2 sprays, nasal, Every 24 hours    Multaq 400 mg, oral, 2 times daily with meals    nitroglycerin (NITROSTAT) 0.4 mg, sublingual, Every 5 min PRN    omega-3 acid ethyl esters (LOVAZA) 1 g, oral, 2 times daily    pioglitazone (ACTOS) 45 mg, oral, Daily    rosuvastatin (Crestor) 40 mg tablet 1 tablet, oral, Daily    sodium bicarbonate 1,300 mg, oral, 2 times daily    sotalol (BETAPACE) 80 mg, oral, Every 12 hours    Tresiba FlexTouch U-100 100 unit/mL (3 mL) injection subcutaneous    Trulicity 1.5 mg, subcutaneous, Weekly    turmeric root extract 500 mg tablet oral    UNKNOWN TO PATIENT oral    vitamin D3-vitamin K2, MK4, (K2 Plus D3) 1,000-100 unit-mcg tablet 1 tablet, oral, Daily         Review of Systems   Constitutional: " Negative.   HENT: Negative.     Eyes: Negative.  Negative for blurred vision.   Cardiovascular:  Positive for leg swelling (stable) and palpitations. Negative for chest pain, dyspnea on exertion, near-syncope and syncope.   Respiratory:  Negative for shortness of breath.    Endocrine: Negative.    Hematologic/Lymphatic: Negative.    Skin: Negative.    Musculoskeletal: Negative.    Gastrointestinal: Negative.    Genitourinary: Negative.    Neurological:  Positive for dizziness (when blood glucose is low) and light-headedness.   Psychiatric/Behavioral: Negative.          Physical Exam  Constitutional:       Appearance: Normal appearance.   HENT:      Head: Normocephalic.      Mouth/Throat:      Mouth: Mucous membranes are moist.   Cardiovascular:      Rate and Rhythm: Normal rate and regular rhythm.   Pulmonary:      Effort: Pulmonary effort is normal.      Breath sounds: Normal breath sounds. No wheezing, rhonchi or rales.   Abdominal:      General: Bowel sounds are normal.      Palpations: Abdomen is soft.   Musculoskeletal:      Cervical back: Normal range of motion.      Right lower leg: Edema (trace) present.      Left lower leg: Edema (trace) present.   Skin:     General: Skin is warm and dry.   Neurological:      General: No focal deficit present.      Mental Status: He is alert and oriented to person, place, and time.           Last Labs:  CBC -  Lab Results   Component Value Date    WBC 5.2 10/25/2022    HGB 14.1 10/25/2022    HCT 43.2 10/25/2022    MCV 96 10/25/2022     10/25/2022       CMP -  Lab Results   Component Value Date    CALCIUM 9.4 10/09/2023    PHOS 3.7 10/09/2023    PROT 7.1 08/24/2022    ALBUMIN 4.6 10/09/2023    AST 22 08/24/2022    ALT 21 08/24/2022    ALKPHOS 70 08/24/2022    BILITOT 0.4 08/24/2022       LIPID PANEL -   Lab Results   Component Value Date    CHOL 111 10/09/2023    TRIG 80 10/09/2023    HDL 40.7 10/09/2023    CHHDL 2.7 10/09/2023    LDLF 51 08/24/2022    VLDL 16  "10/09/2023    NHDL 70 10/09/2023       RENAL FUNCTION PANEL -   Lab Results   Component Value Date    GLUCOSE 80 10/09/2023     10/09/2023    K 4.6 10/09/2023     (H) 10/09/2023    CO2 23 10/09/2023    ANIONGAP 13 10/09/2023    BUN 31 (H) 10/09/2023    CREATININE 1.97 (H) 10/09/2023    GFRMALE 37 (A) 06/01/2023    CALCIUM 9.4 10/09/2023    PHOS 3.7 10/09/2023    ALBUMIN 4.6 10/09/2023        Lab Results   Component Value Date    BNP 86 08/24/2022    HGBA1C 7.2 (H) 10/09/2023       Last Cardiology Tests:  ECG:  No results found for this or any previous visit from the past 1095 days.      Echo:  No results found for this or any previous visit from the past 1095 days.    TTE 6/12/23:Left ventricular systolic function is normal with a 60% estimated ejection fraction.  2. There is reduced right ventricular systolic function.  Ejection Fractions:  No results found for: \"EF\"    Cath:  No results found for this or any previous visit from the past 1095 days.      Stress Test:    NM Cardiac Stress test 8/21:There is a small reversible perfusion defect in the anterolateral  wall suggesting an area of ischemia. There is an intermediate  probability of ischemia.  2. Calculated ejection fraction is 74% without segmental wall motion  abnormalities seen.  Cardiac Imaging:  No results found for this or any previous visit from the past 1095 days.        Lab review: I have personally reviewed the laboratory result(s)     Assessment/Plan   Waldemar Trujillo is a 73 y.o. male with past medical history of chronic diastolic heart failure, coronary artery disease status post bypass done in 2000, paroxysmal atrial fibrillation, antiarrhythmic drug therapy with sotalol (CHANGED TO MULTAQ in 2022 due to worsening renal function), warfarin anticoagulation(now on a DOAC), chronic kidney disease, hypertension, dyslipidemia, diabetes mellitus, and obesity who presents for follow up for hypertension.           Coronary artery disease  s/p " bypass done in 2000.  Nuclear stress done in August 2021 showed a small reversible perfusion defect in the anterolateral wall suggesting an area of ischemia, intermediate probability of ischemia, calculated ejection fraction 74%.   Today, denies chest pain or pressure  Continue medical treatment in presence of significant renal dysfunction.  LDL 51, at goal  Blood pressure is controlled today but reports hypertensive at recent PCP visit  Continue on statin, zetia. Not on ASA, on apixaban      Chronic diastolic heart failure/right ventricular systolic dysfunction  TTE 6/12/23:Left ventricular systolic function is normal with a 60% estimated ejection fraction.  2. There is reduced right ventricular systolic function.  Today, appears compensated and euvolemic on exam  Continue on furosemide as needed, ARB    Paroxysmal atrial fibrillation  Patient has a history of paroxysmal atrial fibrillation and had been on sotalol which was changed to Multaq (2022) to reduce his burden of atrial fibrillation and NOAC for thromboembolism prophylaxis.   Recently had recurrence of Afib and with stable echo and being asymptomatic, it was decided to proceed with rate control strategy.   Discussed monitoring his resting heart rate and to call if above 100, in which case we may add a beta-blocker.  Stable    CKD  Stable, Cr 1.97 on 10/9/23 (last year Cr 1.89-2.8)  Follows with Nephrology    Hypertension  Today BP in office 124/70  Noted to have elevated blood pressure at recent OP visit  Home readings: he states he has not been taking at home   Losartan was recently decrease per nephrology  Although BP controlled at today's visit patient noted to have systolic of 180 at recent PCP visit. Will not adjust losartan as nephrology is trying to decrease, will add on amlodipine 2.5 mg daily and he is to monitor his BP at home.   Return in 2 weeks for RN visit for BP check      Dyslipidemia  Lipid panel10/9/2023: Cholesterol 111 HDL 40 LDL 54  triglycerides 80  LDL at goal     Diabetes mellitus  Hemoglobin A1c done 10/9/23: 7.2%  Medication management per Endocrine      Joyce Linares, APRN-CNP

## 2023-10-13 ENCOUNTER — OFFICE VISIT (OUTPATIENT)
Dept: CARDIOLOGY | Facility: CLINIC | Age: 73
End: 2023-10-13
Payer: MEDICARE

## 2023-10-13 VITALS
SYSTOLIC BLOOD PRESSURE: 124 MMHG | BODY MASS INDEX: 41.23 KG/M2 | HEART RATE: 61 BPM | WEIGHT: 288 LBS | DIASTOLIC BLOOD PRESSURE: 70 MMHG | HEIGHT: 70 IN

## 2023-10-13 DIAGNOSIS — I50.32 CHRONIC DIASTOLIC HEART FAILURE, NYHA CLASS 2 (MULTI): ICD-10-CM

## 2023-10-13 DIAGNOSIS — I10 BENIGN ESSENTIAL HYPERTENSION: Primary | ICD-10-CM

## 2023-10-13 PROCEDURE — 3048F LDL-C <100 MG/DL: CPT | Performed by: CLINICAL NURSE SPECIALIST

## 2023-10-13 PROCEDURE — 99213 OFFICE O/P EST LOW 20 MIN: CPT | Performed by: CLINICAL NURSE SPECIALIST

## 2023-10-13 PROCEDURE — 1160F RVW MEDS BY RX/DR IN RCRD: CPT | Performed by: CLINICAL NURSE SPECIALIST

## 2023-10-13 PROCEDURE — 3074F SYST BP LT 130 MM HG: CPT | Performed by: CLINICAL NURSE SPECIALIST

## 2023-10-13 PROCEDURE — 3051F HG A1C>EQUAL 7.0%<8.0%: CPT | Performed by: CLINICAL NURSE SPECIALIST

## 2023-10-13 PROCEDURE — 4010F ACE/ARB THERAPY RXD/TAKEN: CPT | Performed by: CLINICAL NURSE SPECIALIST

## 2023-10-13 PROCEDURE — 1159F MED LIST DOCD IN RCRD: CPT | Performed by: CLINICAL NURSE SPECIALIST

## 2023-10-13 PROCEDURE — 3078F DIAST BP <80 MM HG: CPT | Performed by: CLINICAL NURSE SPECIALIST

## 2023-10-13 PROCEDURE — 3062F POS MACROALBUMINURIA REV: CPT | Performed by: CLINICAL NURSE SPECIALIST

## 2023-10-13 PROCEDURE — 1126F AMNT PAIN NOTED NONE PRSNT: CPT | Performed by: CLINICAL NURSE SPECIALIST

## 2023-10-13 RX ORDER — AMLODIPINE BESYLATE 2.5 MG/1
2.5 TABLET ORAL DAILY
Qty: 30 TABLET | Refills: 3 | Status: SHIPPED | OUTPATIENT
Start: 2023-10-13 | End: 2023-11-21 | Stop reason: SDUPTHER

## 2023-10-13 ASSESSMENT — ENCOUNTER SYMPTOMS
PSYCHIATRIC NEGATIVE: 1
GASTROINTESTINAL NEGATIVE: 1
DIZZINESS: 1
CONSTITUTIONAL NEGATIVE: 1
BLURRED VISION: 0
NEAR-SYNCOPE: 0
SHORTNESS OF BREATH: 0
ENDOCRINE NEGATIVE: 1
SYNCOPE: 0
LIGHT-HEADEDNESS: 1
DYSPNEA ON EXERTION: 0
HEMATOLOGIC/LYMPHATIC NEGATIVE: 1
MUSCULOSKELETAL NEGATIVE: 1
PALPITATIONS: 1
EYES NEGATIVE: 1

## 2023-10-13 NOTE — PATIENT INSTRUCTIONS
Start amlodipine 2.5 mg daily for your elevated blood pressures earlier this week  Please come back in 2 weeks for a RN visit to check your blood pressure.   Call our office with any new cardiac concerns  Continue current medications  Continue heart-healthy diet. A diet low in sodium, low in cholesterol, limiting red meats and eating whole foods.   Follow up with RN in 2 weeks for blood pressure check, please bring with you your home blood pressure readings to this visit.

## 2023-10-27 ENCOUNTER — OFFICE VISIT (OUTPATIENT)
Dept: CARDIOLOGY | Facility: CLINIC | Age: 73
End: 2023-10-27
Payer: MEDICARE

## 2023-10-27 VITALS — SYSTOLIC BLOOD PRESSURE: 146 MMHG | HEART RATE: 72 BPM | DIASTOLIC BLOOD PRESSURE: 84 MMHG

## 2023-10-27 DIAGNOSIS — I10 BENIGN ESSENTIAL HYPERTENSION: ICD-10-CM

## 2023-11-04 PROBLEM — E55.9 VITAMIN D DEFICIENCY: Status: ACTIVE | Noted: 2023-11-04

## 2023-11-04 PROBLEM — M54.9 CHRONIC BACK PAIN: Status: ACTIVE | Noted: 2023-11-04

## 2023-11-04 PROBLEM — N18.9 CHRONIC KIDNEY DISEASE: Status: ACTIVE | Noted: 2023-04-05

## 2023-11-04 PROBLEM — D68.51 HETEROZYGOUS FACTOR V LEIDEN MUTATION (MULTI): Status: ACTIVE | Noted: 2023-11-04

## 2023-11-04 PROBLEM — Z86.69 HISTORY OF CATARACT: Status: ACTIVE | Noted: 2023-11-04

## 2023-11-04 PROBLEM — J30.9 ALLERGIC RHINITIS: Status: ACTIVE | Noted: 2023-11-04

## 2023-11-04 PROBLEM — G89.29 CHRONIC BACK PAIN: Status: ACTIVE | Noted: 2023-11-04

## 2023-11-04 PROBLEM — Z86.14 HISTORY OF METHICILLIN RESISTANT STAPHYLOCOCCUS AUREUS INFECTION: Status: ACTIVE | Noted: 2023-11-04

## 2023-11-04 PROBLEM — I87.9 DISORDER OF VEIN OF LOWER EXTREMITY: Status: ACTIVE | Noted: 2023-04-05

## 2023-11-04 RX ORDER — ICOSAPENT ETHYL 1 G/1
CAPSULE ORAL
COMMUNITY
Start: 2020-07-02 | End: 2023-12-06 | Stop reason: ALTCHOICE

## 2023-11-04 RX ORDER — SPIRONOLACTONE 25 MG/1
TABLET ORAL
COMMUNITY
Start: 2019-04-02 | End: 2023-11-08 | Stop reason: WASHOUT

## 2023-11-04 RX ORDER — PHENOL 1.4 %
AEROSOL, SPRAY (ML) MUCOUS MEMBRANE
COMMUNITY
Start: 2019-04-02 | End: 2023-11-08 | Stop reason: WASHOUT

## 2023-11-04 RX ORDER — ASCORBIC ACID 250 MG
TABLET ORAL
COMMUNITY
End: 2023-11-08 | Stop reason: WASHOUT

## 2023-11-04 RX ORDER — RAMIPRIL 2.5 MG/1
CAPSULE ORAL
COMMUNITY
Start: 2018-08-10 | End: 2023-11-08 | Stop reason: WASHOUT

## 2023-11-04 RX ORDER — LIRAGLUTIDE 6 MG/ML
1.8 INJECTION SUBCUTANEOUS
COMMUNITY
Start: 2017-10-26 | End: 2023-11-08 | Stop reason: WASHOUT

## 2023-11-06 ENCOUNTER — TELEPHONE (OUTPATIENT)
Dept: ENDOCRINOLOGY | Facility: CLINIC | Age: 73
End: 2023-11-06
Payer: MEDICARE

## 2023-11-08 ENCOUNTER — OFFICE VISIT (OUTPATIENT)
Dept: CARDIOLOGY | Facility: CLINIC | Age: 73
End: 2023-11-08
Payer: MEDICARE

## 2023-11-08 VITALS
BODY MASS INDEX: 36.51 KG/M2 | HEART RATE: 73 BPM | DIASTOLIC BLOOD PRESSURE: 90 MMHG | WEIGHT: 255 LBS | HEIGHT: 70 IN | SYSTOLIC BLOOD PRESSURE: 160 MMHG

## 2023-11-08 DIAGNOSIS — I50.32 CHRONIC DIASTOLIC HEART FAILURE, NYHA CLASS 2 (MULTI): ICD-10-CM

## 2023-11-08 DIAGNOSIS — I10 ESSENTIAL HYPERTENSION: ICD-10-CM

## 2023-11-08 DIAGNOSIS — I10 BENIGN ESSENTIAL HYPERTENSION: ICD-10-CM

## 2023-11-08 DIAGNOSIS — E78.5 HYPERLIPIDEMIA, UNSPECIFIED HYPERLIPIDEMIA TYPE: ICD-10-CM

## 2023-11-08 DIAGNOSIS — I25.10 ATHEROSCLEROSIS OF NATIVE CORONARY ARTERY OF NATIVE HEART WITHOUT ANGINA PECTORIS: Primary | ICD-10-CM

## 2023-11-08 DIAGNOSIS — I48.0 PAROXYSMAL ATRIAL FIBRILLATION (MULTI): ICD-10-CM

## 2023-11-08 PROCEDURE — 1160F RVW MEDS BY RX/DR IN RCRD: CPT | Performed by: NURSE PRACTITIONER

## 2023-11-08 PROCEDURE — 4010F ACE/ARB THERAPY RXD/TAKEN: CPT | Performed by: NURSE PRACTITIONER

## 2023-11-08 PROCEDURE — 3062F POS MACROALBUMINURIA REV: CPT | Performed by: NURSE PRACTITIONER

## 2023-11-08 PROCEDURE — 1126F AMNT PAIN NOTED NONE PRSNT: CPT | Performed by: NURSE PRACTITIONER

## 2023-11-08 PROCEDURE — 3048F LDL-C <100 MG/DL: CPT | Performed by: NURSE PRACTITIONER

## 2023-11-08 PROCEDURE — 3051F HG A1C>EQUAL 7.0%<8.0%: CPT | Performed by: NURSE PRACTITIONER

## 2023-11-08 PROCEDURE — 3077F SYST BP >= 140 MM HG: CPT | Performed by: NURSE PRACTITIONER

## 2023-11-08 PROCEDURE — 99214 OFFICE O/P EST MOD 30 MIN: CPT | Performed by: NURSE PRACTITIONER

## 2023-11-08 PROCEDURE — 3080F DIAST BP >= 90 MM HG: CPT | Performed by: NURSE PRACTITIONER

## 2023-11-08 PROCEDURE — 1159F MED LIST DOCD IN RCRD: CPT | Performed by: NURSE PRACTITIONER

## 2023-11-08 RX ORDER — NEBULIZER AND COMPRESSOR
EACH MISCELLANEOUS
Qty: 1 EACH | Refills: 0 | Status: SHIPPED | OUTPATIENT
Start: 2023-11-08

## 2023-11-08 RX ORDER — LOSARTAN POTASSIUM 25 MG/1
TABLET ORAL
COMMUNITY
Start: 2023-11-07 | End: 2023-12-06 | Stop reason: SDUPTHER

## 2023-11-08 ASSESSMENT — ENCOUNTER SYMPTOMS
NEAR-SYNCOPE: 0
COUGH: 0
ORTHOPNEA: 0
WHEEZING: 0
HEMATOCHEZIA: 0
HEMATURIA: 0
SHORTNESS OF BREATH: 0
DYSPNEA ON EXERTION: 0
VOMITING: 0
FEVER: 0
IRREGULAR HEARTBEAT: 0
PALPITATIONS: 0
SYNCOPE: 0
NAUSEA: 0
CHILLS: 0
ALTERED MENTAL STATUS: 0

## 2023-11-08 NOTE — PATIENT INSTRUCTIONS
Recommend Mediterranean style of eating  Start taking amlodipine 2.5 mg daily  Check renal artery duplex  Follow up with JOHNNIE in 1 month  Follow-up with Dr. Nassar in 3 months  If you have any questions or cardiac concerns, please call our office at [phone].

## 2023-11-08 NOTE — PROGRESS NOTES
Chief Complaint/Reason for Visit:  No chief complaint on file. 1 month cardiovascular follow up    History Of Present Illness:    Waldemar Trujillo is a 73 y.o. male that presents to the office for 1 month follow up.      He states he had an oral procedure about 1 month ago and was noted to have high BP.  Prior to that BP had been stable.  He denies any pain.    During last office visit he was started on amlodipine 2.5 mg daily for HTN. Per documentation nephrology was trying to decrease ARB dose.    Of note, he is unsure of many of his medications.  He did bring a list of medications with him to the visit.  Amlodipine that was ordered during last office visit about 3 weeks ago was not on his list.  Called pharmacy and they state he never picked up medication.  However, patient thinks he did.  He will confirm on bottles once home.    PMH significant for chronic diastolic heart failure, coronary artery disease status post bypass done in 2000, paroxysmal atrial fibrillation, antiarrhythmic drug therapy with sotalol (CHANGED TO MULTAQ in 2022 due to worsening renal function), warfarin anticoagulation(now on a DOAC), chronic kidney disease, hypertension, dyslipidemia, diabetes mellitus, and obesity.     Past Medical History:  He has a past medical history of Activated protein C resistance (CMS/Formerly [facility]), Acute diastolic (congestive) heart failure (CMS/Formerly [facility]) (10/29/2018), Atherosclerosis of native coronary artery of native heart without angina pectoris (04/05/2023), Benign essential hypertension (04/05/2023), Benign prostatic hyperplasia with lower urinary tract symptoms (11/15/2019), Chronic diastolic heart failure, NYHA class 2 (CMS/Formerly [facility]) (04/05/2023), Chronic kidney disease (CKD) stage G3b/A1, moderately decreased glomerular filtration rate (GFR) between 30-44 mL/min/1.73 square meter and albuminuria creatinine ratio less than 30 mg/g (CMS/[facility]ly [facility]) (04/05/2023), Diabetes type 2, controlled (CMS/Formerly [facility]) (04/05/2023), History of  depression (04/05/2023), Hyperlipemia (04/05/2023), Morbid obesity (CMS/formerly [facility]) (04/05/2023), Obesity (BMI 30-39.9) (04/05/2023), KENNEY treated with BiPAP (04/05/2023), Paroxysmal atrial fibrillation (CMS/formerly [facility]) (04/05/2023), Personal history of Methicillin resistant Staphylococcus aureus infection, Personal history of other diseases of male genital organs (11/15/2019), Personal history of other diseases of the musculoskeletal system and connective tissue (11/06/2019), Personal history of other diseases of the musculoskeletal system and connective tissue, Personal history of other diseases of the musculoskeletal system and connective tissue, Personal history of other diseases of the nervous system and sense organs, Personal history of other diseases of the nervous system and sense organs, Personal history of other diseases of the respiratory system (11/15/2019), Personal history of other endocrine, nutritional and metabolic disease (11/15/2019), Personal history of other endocrine, nutritional and metabolic disease (11/15/2019), Personal history of urinary calculi, Right ventricular systolic dysfunction (04/05/2023), Type 2 diabetes mellitus without complications (CMS/formerly [facility]) (12/02/2019), Type 2 diabetes with nephropathy (CMS/formerly [facility]) (04/05/2023), and Venous stasis of lower extremity (04/05/2023).    Past Surgical History:  He has a past surgical history that includes Total hip arthroplasty (10/06/2016); Coronary artery bypass graft (10/06/2016); Cataract extraction (08/28/2017); Other surgical history (10/29/2018); Other surgical history (11/09/2019); Other surgical history (11/09/2019); Other surgical history (11/09/2019); and [place] tooth extraction.      Social History:  He reports that he has quit smoking. His smoking use included cigarettes. He does not have any smokeless tobacco history on file. He reports that he does not currently use alcohol. He reports current drug use. Frequency: 1.00 time per week. Drug:  "Marijuana.    Family History:  Family History   Problem Relation Name Age of Onset    Coronary artery disease Mother      Diabetes Mother      Coronary artery disease Father      Diabetes Father          Allergies:  Ace inhibitors and Atorvastatin    Review of Systems   Constitutional: Negative for chills and fever.   Cardiovascular:  Negative for chest pain, dyspnea on exertion, irregular heartbeat, leg swelling, near-syncope, orthopnea, palpitations and syncope.   Respiratory:  Negative for cough, shortness of breath and wheezing.    Gastrointestinal:  Negative for hematochezia, melena, nausea and vomiting.   Genitourinary:  Negative for hematuria.   Psychiatric/Behavioral:  Negative for altered mental status.        Objective      Vitals reviewed.   Constitutional:       Appearance: Healthy appearance.   Pulmonary:      Effort: Pulmonary effort is normal.      Breath sounds: Normal breath sounds.   Cardiovascular:      PMI at left midclavicular line. Normal rate. Regular rhythm. S1 with normal intensity. S2 with normal intensity.       Murmurs: There is no murmur.   Edema:     Peripheral edema absent.   Abdominal:      General: Bowel sounds are normal.   Skin:     General: Skin is warm and dry.   Psychiatric:         Attention and Perception: Attention normal.         Mood and Affect: Mood normal.         Behavior: Behavior is cooperative.       Current Outpatient Medications   Medication Instructions    alpha lipoic acid 800 mg, oral, 2 times daily    amLODIPine (NORVASC) 2.5 mg, oral, Daily    ascorbic acid (Vitamin C) 250 mg tablet oral, Daily RT    [id number] no.225 [id number] mg-mcg-mcg-mg tablet [id number] no.225 [id number] mg-mcg-mcg-mg tablet 1 capsule with a meal 0 Active    BD Ultra-Fine Mini Pen Needle 31 gauge x 3/16\" needle     chlorthalidone (HYGROTON) 25 mg, oral    coenzyme Q-10 200 mg capsule oral    Eliquis 5 mg tablet 1 tablet, oral, 2 times daily    ezetimibe " (Zetia) 10 mg tablet 1 tablet, oral, Daily    famotidine (Pepcid) 20 mg tablet 1 tablet, oral, Every 12 hours    Farxiga 10 mg 1 tablet, oral, Daily    ferrous gluconate 236 mg (27 mg iron) tablet 1 tablet, oral, Daily    fish oil concentrate (Omega-3) 120-180 mg capsule 1 g, oral, Daily    furosemide (LASIX) 40 mg, oral, Daily    glimepiride (Amaryl) 4 mg tablet 1 tablet, oral, 2 times daily    hydrALAZINE (APRESOLINE) 25 mg, oral, Every 12 hours    icosapent ethyL (Vascepa) 1 gram capsule oral    insulin glargine (Toujeo Max U-300 SoloStar) 300 unit/mL (3 mL) injection subcutaneous    losartan (Cozaar) 25 mg tablet     losartan (COZAAR) 25 mg, oral, Daily, Patient reports he decreased dose on advice from kidney doctor    molnupiravir (Lagevrio, EUA,) 200 mg capsule oral, 2 times daily    mometasone (Nasonex) 50 mcg/actuation nasal spray 2 sprays, nasal, Every 24 hours    Multaq 400 mg, oral, 2 times daily with meals    multivitamin with minerals (Adults Multivitamin) tablet oral, Daily RT    multivitamin with minerals (Adults Multivitamin) tablet oral, Daily RT    nitroglycerin (NITROSTAT) 0.4 mg, sublingual, Every 5 min PRN    omega-3 acid ethyl esters (LOVAZA) 1 g, oral, 2 times daily    pioglitazone (ACTOS) 45 mg, oral, Daily    ramipril (Altace) 2.5 mg capsule oral, Daily RT    rosuvastatin (CRESTOR) 40 mg, oral, Daily    sodium bicarbonate 1,300 mg, oral, 2 times daily    sotalol (BETAPACE) 80 mg, oral, Every 12 hours    spironolactone (Aldactone) 25 mg tablet oral, Daily RT    Tresiba FlexTouch U-100 100 unit/mL (3 mL) injection subcutaneous    Trulicity 1.5 mg, subcutaneous, Weekly    turmeric root extract 500 mg tablet oral    UNKNOWN TO PATIENT oral    Victoza 2-Kehinde 1.8 mg, subcutaneous, Daily RT    vitamin D3-vitamin K2, MK4, (K2 Plus D3) 1,000-100 unit-mcg tablet 1 tablet, oral, Daily    VITAMIN K2, MK-4, ORAL 1 tablet, oral, Daily RT        Last Labs:  CBC -  Lab Results   Component Value Date    WBC  "5.2 10/25/2022    HGB 14.1 10/25/2022    HCT 43.2 10/25/2022    MCV 96 10/25/2022     10/25/2022       CMP -  Lab Results   Component Value Date    CALCIUM 9.4 10/09/2023    PHOS 3.7 10/09/2023    PROT 7.1 08/24/2022    ALBUMIN 4.6 10/09/2023    AST 22 08/24/2022    ALT 21 08/24/2022    ALKPHOS 70 08/24/2022    BILITOT 0.4 08/24/2022       LIPID PANEL -   Lab Results   Component Value Date    CHOL 111 10/09/2023    TRIG 80 10/09/2023    HDL 40.7 10/09/2023    CHHDL 2.7 10/09/2023    LDLF 51 08/24/2022    VLDL 16 10/09/2023    NHDL 70 10/09/2023       RENAL FUNCTION PANEL -   Lab Results   Component Value Date    GLUCOSE 80 10/09/2023     10/09/2023    K 4.6 10/09/2023     (H) 10/09/2023    CO2 23 10/09/2023    ANIONGAP 13 10/09/2023    BUN 31 (H) 10/09/2023    CREATININE 1.97 (H) 10/09/2023    GFRMALE 37 (A) 06/01/2023    CALCIUM 9.4 10/09/2023    PHOS 3.7 10/09/2023    ALBUMIN 4.6 10/09/2023        Lab Results   Component Value Date    BNP 86 08/24/2022    HGBA1C 7.2 (H) 10/09/2023       No results found for this or any previous visit.     Last Cardiology Tests:    Echo 6/12/23:   1. Left ventricular systolic function is normal with a 60% estimated ejection fraction.  2. There is reduced right ventricular systolic function.    Stress test 8/18/21:    1. No clinical or electrocardiographic evidence for ischemia at maximal infusion.   2. Nuclear image results are reported separately.  1. There is a small reversible perfusion defect in the anterolateral  wall suggesting an area of ischemia. There is an intermediate  probability of ischemia.  2. Calculated ejection fraction is 74% without segmental wall motion  abnormalities seen.    Visit Vitals  BP (!) 188/98   Pulse 73   Ht 1.778 m (5' 10\")   Wt 116 kg (255 lb)   BMI 36.59 kg/m²   Smoking Status Former   BSA 2.39 m²       Assessment/Plan   The primary encounter diagnosis was Atherosclerosis of native coronary artery of native heart without angina " pectoris. Diagnoses of Chronic diastolic heart failure, NYHA class 2 (CMS/HCC), Paroxysmal atrial fibrillation (CMS/HCC), Benign essential hypertension, and Hyperlipidemia, unspecified hyperlipidemia type were also pertinent to this visit.    1. Coronary artery disease s/p bypass done in 2000.  Nuclear stress done in August 2021 showed a small reversible perfusion defect in the anterolateral wall suggesting an area of ischemia, intermediate probability of ischemia, calculated ejection fraction 74%.   Today, denies chest pain or pressure  Continue medical treatment in presence of significant renal dysfunction and absence of anginal symtpoms.  Continue on statin, zetia. Not on ASA, on apixaban      2. Chronic diastolic heart failure/right ventricular systolic dysfunction  TTE 6/12/23:Left ventricular systolic function is normal with a 60% estimated ejection fraction. There is reduced right ventricular systolic function.  He does not appear significantly volume overloaded on exam   Continue on furosemide as needed, ARB     3. Paroxysmal atrial fibrillation  Patient has a history of paroxysmal atrial fibrillation and had been on sotalol which was changed to Multaq (2022) to reduce his burden of atrial fibrillation and NOAC for thromboembolism prophylaxis.   Recently had recurrence of Afib and with stable echo and being asymptomatic, it was decided to proceed with rate control strategy.   Discussed monitoring his resting heart rate and to call if above 100, in which case we may add a beta-blocker.  Stable  Check CBC as he reports anemia in the past.  Last CBC done in 2022     4. CKD  Follows with Nephrology  Checking renal artery duplex as above     5. Hypertension  Elevated  Losartan was recently decrease per nephrology  Start amlodipine 2.5 mg daily that was ordered during last office visit about 3 weeks ago  Script given for BP cuff  Check renal artery duplex with his recently elevated BP and elevated creatinine      6. Dyslipidemia  Goal LDL <70.  He is at goal.  Continue ezetimibe 10 mg daily and rosuvastatin 40 mg daily  Check ALT/AST     7. Diabetes mellitus  Hemoglobin A1c done 10/9/23: 7.2%  Medication management per endocrinology    Tammi Chicas, APRN-CNP

## 2023-11-09 ENCOUNTER — APPOINTMENT (OUTPATIENT)
Dept: CARDIOLOGY | Facility: CLINIC | Age: 73
End: 2023-11-09
Payer: MEDICARE

## 2023-11-13 ENCOUNTER — TELEPHONE (OUTPATIENT)
Dept: CARDIOLOGY | Facility: CLINIC | Age: 73
End: 2023-11-13

## 2023-11-13 ENCOUNTER — LAB (OUTPATIENT)
Dept: LAB | Facility: LAB | Age: 73
End: 2023-11-13
Payer: MEDICARE

## 2023-11-13 DIAGNOSIS — I48.0 PAROXYSMAL ATRIAL FIBRILLATION (MULTI): ICD-10-CM

## 2023-11-13 DIAGNOSIS — Z12.5 SCREENING FOR PROSTATE CANCER: ICD-10-CM

## 2023-11-13 DIAGNOSIS — E78.5 HYPERLIPIDEMIA, UNSPECIFIED HYPERLIPIDEMIA TYPE: ICD-10-CM

## 2023-11-13 LAB
ALT SERPL W P-5'-P-CCNC: 15 U/L (ref 10–52)
AST SERPL W P-5'-P-CCNC: 18 U/L (ref 9–39)
ERYTHROCYTE [DISTWIDTH] IN BLOOD BY AUTOMATED COUNT: 12.6 % (ref 11.5–14.5)
HCT VFR BLD AUTO: 44.4 % (ref 41–52)
HGB BLD-MCNC: 14.7 G/DL (ref 13.5–17.5)
MCH RBC QN AUTO: 31.1 PG (ref 26–34)
MCHC RBC AUTO-ENTMCNC: 33.1 G/DL (ref 32–36)
MCV RBC AUTO: 94 FL (ref 80–100)
NRBC BLD-RTO: 0 /100 WBCS (ref 0–0)
PLATELET # BLD AUTO: 155 X10*3/UL (ref 150–450)
PSA SERPL-MCNC: 1.89 NG/ML
RBC # BLD AUTO: 4.73 X10*6/UL (ref 4.5–5.9)
WBC # BLD AUTO: 5 X10*3/UL (ref 4.4–11.3)

## 2023-11-13 PROCEDURE — 85027 COMPLETE CBC AUTOMATED: CPT

## 2023-11-13 PROCEDURE — G0103 PSA SCREENING: HCPCS

## 2023-11-13 PROCEDURE — 84450 TRANSFERASE (AST) (SGOT): CPT

## 2023-11-13 PROCEDURE — 36415 COLL VENOUS BLD VENIPUNCTURE: CPT

## 2023-11-13 PROCEDURE — 84460 ALANINE AMINO (ALT) (SGPT): CPT

## 2023-11-13 NOTE — TELEPHONE ENCOUNTER
----- Message from BETO Pratt sent at 11/13/2023 12:24 PM EST -----  Patient wife called and spoke to Carlos Eduardo on the phone.  His BP has been elevated even after starting amlodipine 2.5 mg daily.  BP at home 167/76.  Please notify to increase amlodipine 5 mg daily. He has f/u with Dr. Nassar 12/6/23

## 2023-11-13 NOTE — TELEPHONE ENCOUNTER
Called him with instructions to increase amlodipine to 5 mg daily.  I asked if he had enough to take the increased dose for a week.  He siad he wasn't sure but he would call back if he needed more to get to his next visit.

## 2023-11-21 DIAGNOSIS — I10 BENIGN ESSENTIAL HYPERTENSION: ICD-10-CM

## 2023-11-22 RX ORDER — AMLODIPINE BESYLATE 5 MG/1
5 TABLET ORAL DAILY
Qty: 90 TABLET | Refills: 0 | Status: SHIPPED | OUTPATIENT
Start: 2023-11-22 | End: 2024-01-06 | Stop reason: SDUPTHER

## 2023-12-01 DIAGNOSIS — E11.9 CONTROLLED TYPE 2 DIABETES MELLITUS WITHOUT COMPLICATION, WITH LONG-TERM CURRENT USE OF INSULIN (MULTI): Primary | ICD-10-CM

## 2023-12-01 DIAGNOSIS — Z79.4 CONTROLLED TYPE 2 DIABETES MELLITUS WITHOUT COMPLICATION, WITH LONG-TERM CURRENT USE OF INSULIN (MULTI): Primary | ICD-10-CM

## 2023-12-01 RX ORDER — DULAGLUTIDE 1.5 MG/.5ML
1.5 INJECTION, SOLUTION SUBCUTANEOUS
Qty: 2 ML | Refills: 1 | Status: SHIPPED | OUTPATIENT
Start: 2023-12-01 | End: 2023-12-06 | Stop reason: ALTCHOICE

## 2023-12-01 NOTE — ASSESSMENT & PLAN NOTE
Patient has a history of paroxysmal atrial fibrillation and had been on sotalol which was changed to Multaq (2022) to reduce his burden of atrial fibrillation and NOAC for thromboembolism prophylaxis.   Electrocardiogram in office today demonstrates: Afib/flutter with rate of 58 bpm.  Recently had recurrence of Afib and with stable echo and being asymptomatic, it was decided to proceed with rate control strategy.   Discussed monitoring his resting heart rate and to call if above 100, in which case we may add a beta-blocker.

## 2023-12-01 NOTE — ASSESSMENT & PLAN NOTE
Lipid panel 8/24/2022: Cholesterol 103 HDL 31 LDL 51 triglycerides 105  Repeat FLP ordered and to be done per endocrinologist.

## 2023-12-01 NOTE — ASSESSMENT & PLAN NOTE
s/p bypass done in 2000.  Today, reports chronic chest pressure that has been chronic and not worsening with heavy exertion, lasts seconds.   Nuclear stress done in August 2021 showed a small reversible perfusion defect in the anterolateral wall suggesting an area of ischemia, intermediate probability of ischemia, calculated ejection fraction 74%.   Continue medical treatment in presence of significant renal dysfunction.  LDL 51, at goal  Blood pressure 140/78, patient had not taken his morning medications prior to appt  Continue on statin, zetia. Not on ASA, on apixaban

## 2023-12-01 NOTE — ASSESSMENT & PLAN NOTE
Today BP in office 140/78  Blood pressure elevated today, patient reports he has not taken his medications this morning.   Patient should continue his current antihypertensive medications.

## 2023-12-01 NOTE — ASSESSMENT & PLAN NOTE
TTE 6/12/23:Left ventricular systolic function is normal with a 60% estimated ejection fraction.  2. There is reduced right ventricular systolic function.  Today, appears compensated and euvolemic on exam  Continue on furosemide as needed, ARB

## 2023-12-01 NOTE — TELEPHONE ENCOUNTER
Spouse called for refill on Trulicity to be sent to [facility].  He will be switching to a different mail order company next year and will provide the office with that information when they receive it.

## 2023-12-06 ENCOUNTER — HOSPITAL ENCOUNTER (OUTPATIENT)
Dept: VASCULAR MEDICINE | Facility: HOSPITAL | Age: 73
Discharge: HOME | End: 2023-12-06
Payer: MEDICARE

## 2023-12-06 ENCOUNTER — OFFICE VISIT (OUTPATIENT)
Dept: CARDIOLOGY | Facility: CLINIC | Age: 73
End: 2023-12-06
Payer: MEDICARE

## 2023-12-06 VITALS
SYSTOLIC BLOOD PRESSURE: 182 MMHG | DIASTOLIC BLOOD PRESSURE: 92 MMHG | WEIGHT: 254 LBS | HEART RATE: 69 BPM | HEIGHT: 70 IN | BODY MASS INDEX: 36.36 KG/M2

## 2023-12-06 DIAGNOSIS — I25.10 ATHEROSCLEROSIS OF NATIVE CORONARY ARTERY OF NATIVE HEART WITHOUT ANGINA PECTORIS: ICD-10-CM

## 2023-12-06 DIAGNOSIS — I10 BENIGN ESSENTIAL HYPERTENSION: ICD-10-CM

## 2023-12-06 DIAGNOSIS — I50.32 CHRONIC DIASTOLIC HEART FAILURE, NYHA CLASS 2 (MULTI): Primary | ICD-10-CM

## 2023-12-06 DIAGNOSIS — G47.33 OBSTRUCTIVE SLEEP APNEA SYNDROME: ICD-10-CM

## 2023-12-06 DIAGNOSIS — I10 ESSENTIAL HYPERTENSION: ICD-10-CM

## 2023-12-06 DIAGNOSIS — I25.10 ATHEROSCLEROSIS OF NATIVE CORONARY ARTERY OF NATIVE HEART, UNSPECIFIED WHETHER ANGINA PRESENT: ICD-10-CM

## 2023-12-06 DIAGNOSIS — I48.11 LONGSTANDING PERSISTENT ATRIAL FIBRILLATION (MULTI): ICD-10-CM

## 2023-12-06 PROBLEM — I48.0 PAROXYSMAL ATRIAL FIBRILLATION (MULTI): Status: RESOLVED | Noted: 2023-04-05 | Resolved: 2023-12-06

## 2023-12-06 PROCEDURE — 1159F MED LIST DOCD IN RCRD: CPT | Performed by: INTERNAL MEDICINE

## 2023-12-06 PROCEDURE — 1160F RVW MEDS BY RX/DR IN RCRD: CPT | Performed by: INTERNAL MEDICINE

## 2023-12-06 PROCEDURE — 4010F ACE/ARB THERAPY RXD/TAKEN: CPT | Performed by: INTERNAL MEDICINE

## 2023-12-06 PROCEDURE — 93975 VASCULAR STUDY: CPT | Performed by: INTERNAL MEDICINE

## 2023-12-06 PROCEDURE — 93975 VASCULAR STUDY: CPT

## 2023-12-06 PROCEDURE — 99214 OFFICE O/P EST MOD 30 MIN: CPT | Performed by: INTERNAL MEDICINE

## 2023-12-06 PROCEDURE — 1126F AMNT PAIN NOTED NONE PRSNT: CPT | Performed by: INTERNAL MEDICINE

## 2023-12-06 PROCEDURE — 3051F HG A1C>EQUAL 7.0%<8.0%: CPT | Performed by: INTERNAL MEDICINE

## 2023-12-06 PROCEDURE — 93000 ELECTROCARDIOGRAM COMPLETE: CPT | Performed by: INTERNAL MEDICINE

## 2023-12-06 PROCEDURE — 3048F LDL-C <100 MG/DL: CPT | Performed by: INTERNAL MEDICINE

## 2023-12-06 PROCEDURE — 3062F POS MACROALBUMINURIA REV: CPT | Performed by: INTERNAL MEDICINE

## 2023-12-06 PROCEDURE — 3077F SYST BP >= 140 MM HG: CPT | Performed by: INTERNAL MEDICINE

## 2023-12-06 PROCEDURE — 3080F DIAST BP >= 90 MM HG: CPT | Performed by: INTERNAL MEDICINE

## 2023-12-06 RX ORDER — LOSARTAN POTASSIUM 25 MG/1
50 TABLET ORAL DAILY
Qty: 60 TABLET | Refills: 11 | Status: CANCELLED | OUTPATIENT
Start: 2023-12-06 | End: 2024-12-05

## 2023-12-06 RX ORDER — ROSUVASTATIN CALCIUM 40 MG/1
40 TABLET, COATED ORAL DAILY
Qty: 90 TABLET | Refills: 3 | Status: CANCELLED | OUTPATIENT
Start: 2023-12-06

## 2023-12-06 RX ORDER — LOSARTAN POTASSIUM 25 MG/1
50 TABLET ORAL DAILY
Qty: 60 TABLET | Refills: 11 | Status: SHIPPED | OUTPATIENT
Start: 2023-12-06 | End: 2024-02-27 | Stop reason: SDUPTHER

## 2023-12-06 ASSESSMENT — ENCOUNTER SYMPTOMS
OCCASIONAL FEELINGS OF UNSTEADINESS: 0
LOSS OF SENSATION IN FEET: 0
DEPRESSION: 1

## 2023-12-06 NOTE — PATIENT INSTRUCTIONS
"Please check your medications and ensure it correlates what the list states.  If there is a discrepancy please call our nurse to let us know.    Please take the Lasix 40 mg every day and go up on the losartan to 50 mg a day.  If your blood pressure is not less than 130/80 with these changes, after 4 days, please increase the losartan to 100 mg a day.    Check your blood pressure daily and keep a record.  Bring it with you at your next appointment with our nurse practitioner in 2 weeks.    We recommend that you take less than 2 gram of sodium/day.   What should I do to reduce the amount of sodium in my diet? - Many people think that avoiding the salt shaker and not adding salt to their food means that they are eating a low-sodium diet. This is not true. Not adding salt at the table or when cooking will help a little. But almost all of the sodium you eat is already in the food you buy at the grocery store or at restaurants. The most important thing you can do to cut down on sodium is to eat less processed food. That means that you should avoid most foods that are sold in cans, boxes, jars, and bags. You should also eat in restaurants less often.  Instead of buying pre-made, processed foods, buy fresh or fresh-frozen fruits and vegetables. (Fresh-frozen foods are foods that are frozen without anything added to them.) Buy meats, fish, chicken, and turkey that are fresh instead of canned or sold at the deli counter. Then try making meals from scratch at home using these low-sodium ingredients.  If you must buy canned or packaged foods, choose ones that are labeled \"sodium free\" or \"very low sodium\". Or choose foods that have less than 400 milligrams of sodium in each serving. The amount of sodium in each serving appears on the nutrition label that is printed on canned or packaged foods.  What if I really like to eat out? - You can still eat in restaurants once in a while. But choose places that offer healthier choices. " Fast-food places are almost always a bad idea. As an example, a typical meal of a hamburger and french fries from a popular fast-food chain has about 1,600 milligrams of sodium. That's more sodium than some people should eat in a day!  When choosing what to order:  1) Ask your  if your meal can be made without salt  2) Avoid foods that come with sauces or dips  3) Choose plain grilled meats or fish and steamed vegetables  4) Ask for oil and vinegar for your salad, rather than dressing  What if food just does not taste as good without sodium? - First of all, give it time. Your taste buds can get used to having less sodium, but you have to give them a chance to adjust. Also try other flavorings, such as herbs and spices, lemon juice, and vinegar.

## 2023-12-06 NOTE — PROGRESS NOTES
Chief Complaint:   Follow-up (Elevated BP an med changes)     History Of Present Illness:    Waldemar Trujillo is a 73 y.o. male here for urgent follow-up regarding his high blood pressure.  Apparently for last 1 month since a dental procedure this has gone up.  No other change in medication intake although patient is not completely certain of medications he is taking at home.  Looks like he was started on amlodipine 5 mg by our JOHNNIE and set up for a renal artery ultrasound.    No new symptoms.  No change in diet.  No increased alcohol use.  Patient has prior history of sleep apnea but the machine was recalled and then he lost 100 pounds and never did any follow-up about sleep apnea treatment.    History of chronic diastolic heart failure, coronary artery disease status post bypass done in 2000, paroxysmal atrial fibrillation, antiarrhythmic drug therapy with sotalol(CHANGED TO MULTAQ in 2022 due to worsening renal function), warfarin anticoagulation(now on a DOAC), chronic kidney disease, hypertension, dyslipidemia, diabetes mellitus, and obesity.   Nuclear stress done in August 2021 showed a small reversible perfusion defect in the anterolateral wall suggesting an area of ischemia, intermediate probability of ischemia, calculated ejection fraction 74%. Echocardiogram done in November 2018 showed normal left ventricular systolic function with an ejection fraction 55%, grade 2 diastolic dysfunction, dilated right ventricle with moderately reduced right ventricular systolic function, no significant valve abnormalities.   The patient reports that he has been feeling reasonably well from the cardiac perspective. He does not have chest pain or shortness of breath with activities. He states that his heart failure has been managed well with a low-dose diuretics every other day for years.. He has not been hospitalized with heart failure in the past 5 years. He states that his atrial fibrillation used to be bothersome to him  "and he was highly symptomatic but since being on sotalol, and then eventually Multaq, he has not had any symptomatic recurrence.     He is ECG today shows atrial fibrillation.  EKG from November 2022 showed that he was sinus rhythm at that time    .     Last Recorded Vitals:  Vitals:    12/06/23 1142   BP: (!) 182/92   BP Location: Right arm   Pulse: 69   Weight: 115 kg (254 lb)   Height: 1.778 m (5' 10\")       Past Medical History:  He has a past medical history of Activated protein C resistance (CMS/[facility]), Acute diastolic (congestive) heart failure (CMS/[facility]) (10/29/2018), Atherosclerosis of native coronary artery of native heart without angina pectoris (04/05/2023), Benign essential hypertension (04/05/2023), Benign prostatic hyperplasia with lower urinary tract symptoms (11/15/2019), Chronic diastolic heart failure, NYHA class 2 (CMS/HCC) (04/05/2023), Chronic kidney disease (CKD) stage G3b/A1, moderately decreased glomerular filtration rate (GFR) between 30-44 mL/min/1.73 square meter and albuminuria creatinine ratio less than 30 mg/g (CMS/HCC) (04/05/2023), Diabetes type 2, controlled (CMS/HCC) (04/05/2023), History of depression (04/05/2023), Hyperlipemia (04/05/2023), Morbid obesity (CMS/[facility]) (04/05/2023), Obesity (BMI 30-39.9) (04/05/2023), KENNEY treated with BiPAP (04/05/2023), Paroxysmal atrial fibrillation (CMS/[facility]) (04/05/2023), Personal history of Methicillin resistant Staphylococcus aureus infection, Personal history of other diseases of male genital organs (11/15/2019), Personal history of other diseases of the musculoskeletal system and connective tissue (11/06/2019), Personal history of other diseases of the musculoskeletal system and connective tissue, Personal history of other diseases of the musculoskeletal system and connective tissue, Personal history of other diseases of the nervous system and sense organs, Personal history of other diseases of the nervous system and sense organs, Personal " "history of other diseases of the respiratory system (11/15/2019), Personal history of other endocrine, nutritional and metabolic disease (11/15/2019), Personal history of other endocrine, nutritional and metabolic disease (11/15/2019), Personal history of urinary calculi, Right ventricular systolic dysfunction (04/05/2023), Type 2 diabetes mellitus without complications (CMS/HCC) (12/02/2019), Type 2 diabetes with nephropathy (CMS/[facility]) (04/05/2023), and Venous stasis of lower extremity (04/05/2023).    Past Surgical History:  He has a past surgical history that includes Total hip arthroplasty (10/06/2016); Coronary artery bypass graft (10/06/2016); Cataract extraction (08/28/2017); Other surgical history (10/29/2018); Other surgical history (11/09/2019); Other surgical history (11/09/2019); Other surgical history (11/09/2019); and [place] tooth extraction.      Social History:  He reports that he has quit smoking. His smoking use included cigarettes. He does not have any smokeless tobacco history on file. He reports that he does not currently use alcohol. He reports current drug use. Frequency: 1.00 time per week. Drug: Marijuana.    Family History:  Family History   Problem Relation Name Age of Onset    Coronary artery disease Mother      Diabetes Mother      Coronary artery disease Father      Diabetes Father          Allergies:  Ace inhibitors and Atorvastatin    Outpatient Medications:  Current Outpatient Medications   Medication Instructions    alpha lipoic acid 800 mg, oral, 2 times daily    amLODIPine (NORVASC) 5 mg, oral, Daily    [id number]-co F00-gajy no.[phone] mg-mcg-mcg-mg tablet [id number]-co N21-afrh no.225 [phone] mg-mcg-mcg-mg tablet 1 capsule with a meal 0 Active    BD Ultra-Fine Mini Pen Needle 31 gauge x 3/16\" needle     coenzyme Q-10 200 mg capsule oral    Eliquis 5 mg tablet 1 tablet, oral, 2 times daily    ezetimibe (Zetia) 10 mg tablet 1 tablet, oral, Daily    famotidine " (Pepcid) 20 mg tablet 1 tablet, oral, Every 12 hours    Farxiga 10 mg 1 tablet, oral, Daily    ferrous gluconate 236 mg (27 mg iron) tablet 1 tablet, oral, Daily    fish oil concentrate (Omega-3) 120-180 mg capsule 1 g, oral, Daily    furosemide (LASIX) 40 mg, oral, Daily    glimepiride (Amaryl) 4 mg tablet 1 tablet, oral, 2 times daily    icosapent ethyL (Vascepa) 1 gram capsule oral    insulin glargine (Toujeo Max U-300 SoloStar) 300 unit/mL (3 mL) injection subcutaneous    losartan (Cozaar) 25 mg tablet     miscellaneous medical supply (Blood Pressure Cuff) misc Daily home blood pressure monitoring    mometasone (Nasonex) 50 mcg/actuation nasal spray 2 sprays, nasal, Every 24 hours    nitroglycerin (NITROSTAT) 0.4 mg, sublingual, Every 5 min PRN    omega-3 acid ethyl esters (LOVAZA) 1 g, oral, 2 times daily    rosuvastatin (CRESTOR) 40 mg, oral, Daily    sodium bicarbonate 1,300 mg, oral, 2 times daily    Tresiba FlexTouch U-100 100 unit/mL (3 mL) injection subcutaneous    Trulicity 1.5 mg, subcutaneous, Weekly    turmeric root extract 500 mg tablet oral    vitamin D3-vitamin K2, MK4, (K2 Plus D3) 1,000-100 unit-mcg tablet 1 tablet, oral, Daily    VITAMIN K2, MK-4, ORAL 1 tablet, oral, Daily RT       Physical Exam:  Physical Exam  Vitals reviewed.   Constitutional:       Appearance: Normal appearance.   Neck:      Vascular: No carotid bruit or JVD.   Cardiovascular:      Rate and Rhythm: Normal rate and regular rhythm.      Heart sounds: Normal heart sounds, S1 normal and S2 normal. No murmur heard.  Pulmonary:      Effort: Pulmonary effort is normal.      Breath sounds: Normal breath sounds.   Abdominal:      General: Abdomen is flat. Bowel sounds are normal.      Palpations: Abdomen is soft.   Musculoskeletal:      Right lower le+ Pitting Edema present.      Left lower le+ Pitting Edema present.   Skin:     General: Skin is warm.   Neurological:      Mental Status: He is alert. Mental status is at  "baseline.   Psychiatric:         Mood and Affect: Mood normal.         Behavior: Behavior normal.           Last Labs:  CBC -  Lab Results   Component Value Date    WBC 5.0 11/13/2023    HGB 14.7 11/13/2023    HCT 44.4 11/13/2023    MCV 94 11/13/2023     11/13/2023       CMP -  Lab Results   Component Value Date    CALCIUM 9.4 10/09/2023    PHOS 3.7 10/09/2023    PROT 7.1 08/24/2022    ALBUMIN 4.6 10/09/2023    AST 18 11/13/2023    ALT 15 11/13/2023    ALKPHOS 70 08/24/2022    BILITOT 0.4 08/24/2022       LIPID PANEL -   Lab Results   Component Value Date    CHOL 111 10/09/2023    TRIG 80 10/09/2023    HDL 40.7 10/09/2023    CHHDL 2.7 10/09/2023    LDLF 51 08/24/2022    VLDL 16 10/09/2023    NHDL 70 10/09/2023       RENAL FUNCTION PANEL -   Lab Results   Component Value Date    GLUCOSE 80 10/09/2023     10/09/2023    K 4.6 10/09/2023     (H) 10/09/2023    CO2 23 10/09/2023    ANIONGAP 13 10/09/2023    BUN 31 (H) 10/09/2023    CREATININE 1.97 (H) 10/09/2023    GFRMALE 37 (A) 06/01/2023    CALCIUM 9.4 10/09/2023    PHOS 3.7 10/09/2023    ALBUMIN 4.6 10/09/2023        Lab Results   Component Value Date    BNP 86 08/24/2022    HGBA1C 7.2 (H) 10/09/2023       Last Cardiology Tests:  ECG:  No results found for this or any previous visit from the past 1095 days.      Echo:  No results found for this or any previous visit from the past 1095 days.      Ejection Fractions:  No results found for: \"EF\"    Cath:  No results found for this or any previous visit from the past 1095 days.      Stress Test:  Nuclear Stress Test 08/18/2021      Cardiac Imaging:  No results found for this or any previous visit from the past 1095 days.          Assessment/Plan   1. Coronary artery disease  The patient has a history of coronary artery disease status post bypass done in 2000.  This appears stable as he denies any anginal chest discomfort.  Nuclear stress done in August 2021 showed a small reversible perfusion defect in " the anterolateral wall suggesting an area of ischemia, intermediate probability of ischemia, calculated ejection fraction 74%. It is best to treat this medically in the absence of symptoms and in presence of significant renal dysfunction.  Echocardiogram done in November 2018 showed normal left ventricular systolic function with an ejection fraction 55%, grade 2 diastolic dysfunction, dilated right ventricle with moderately reduced right ventricular systolic function, no significant valve abnormalities.  Would continue with current medical therapy. Lipid profile is favorable. Because he is on oral anticoagulants I advised him to stop taking the aspirin.        2. Chronic diastolic heart failure/right ventricular systolic dysfunction  Patient has a history of chronic diastolic heart failure and does follow with the heart failure clinic. He has not been there in a while. Symptoms have been well managed with a low-dose of diuretics every other day and he has not had any heart failure exacerbations for a while. He is NYHA class II.  Echocardiogram done in November 2018 showed normal left ventricular systolic function with an ejection fraction 55%, grade 2 diastolic dysfunction, dilated right ventricle with moderately reduced right ventricular systolic function, no significant valve abnormalities.  The patient should continue his current heart failure medications.  Low-sodium diet.     3. Paroxysmal atrial fibrillation  Patient has a history of paroxysmal atrial fibrillation and has been on sotalol to reduce his burden of atrial fibrillation and NOAC for thromboembolism prophylaxis. However with worsening renal function and creatinine clearance of 33, we changed this to Multaq in 2022. With that he was maintaining sinus rhythm but more recently has had recurrence of atrial fibrillation. He seems to be asymptomatic.  We checked echo LV function is unchanged we continue with rate control.  We also discussed option of  rhythm control with either amiodarone plus cardioversion or ablation. After risk-benefit alternative discussion of each approach he wants to proceed with rate control only for the time being and if symptoms change may change strategy.         4. CKD  Stable, recent blood work as noted in HPI.     5. Hypertension  Blood pressure appears uncontrolled.  Patient appears mildly volume overloaded.  He was advised to take the diuretic regimen regularly and follow-up closely with our JOHNNIE in 2 weeks for blood pressure monitoring and renal function monitoring along with electrolytes.    I also increased the losartan to 50 mg.  If blood pressure remains elevated he may be able to increase it to 100 mg in a few days.  We will also screen him for sleep apnea which remains untreated at this point although he may have gotten better with weight loss.  Home sleep study is being arranged.    6. Dyslipidemia  Favorable continue current management.               Theresa Nassar MD

## 2023-12-08 PROBLEM — E11.9 TYPE 2 DIABETES MELLITUS WITHOUT COMPLICATIONS (MULTI): Status: ACTIVE | Noted: 2023-12-08

## 2023-12-08 PROBLEM — I48.0 PAROXYSMAL ATRIAL FIBRILLATION (MULTI): Status: ACTIVE | Noted: 2023-12-08

## 2023-12-08 PROBLEM — E78.5 HYPERLIPEMIA: Status: ACTIVE | Noted: 2023-12-08

## 2023-12-08 PROBLEM — Z86.14 PERSONAL HISTORY OF METHICILLIN RESISTANT STAPHYLOCOCCUS AUREUS INFECTION: Status: ACTIVE | Noted: 2023-12-08

## 2023-12-08 PROBLEM — E66.9 OBESITY (BMI 30-39.9): Status: ACTIVE | Noted: 2023-12-08

## 2023-12-08 PROBLEM — D68.51 ACTIVATED PROTEIN C RESISTANCE (MULTI): Status: ACTIVE | Noted: 2023-12-08

## 2023-12-08 PROBLEM — I50.31 ACUTE DIASTOLIC (CONGESTIVE) HEART FAILURE (MULTI): Status: ACTIVE | Noted: 2023-12-08

## 2023-12-08 PROBLEM — Z87.09 PERSONAL HISTORY OF OTHER DISEASES OF THE RESPIRATORY SYSTEM: Status: ACTIVE | Noted: 2023-12-08

## 2023-12-08 PROBLEM — I87.8 VENOUS STASIS OF LOWER EXTREMITY: Status: ACTIVE | Noted: 2023-12-08

## 2023-12-08 PROBLEM — G47.33 OSA TREATED WITH BIPAP: Status: ACTIVE | Noted: 2023-12-08

## 2023-12-08 PROBLEM — Z87.438 PERSONAL HISTORY OF OTHER DISEASES OF MALE GENITAL ORGANS: Status: ACTIVE | Noted: 2023-12-08

## 2023-12-08 PROBLEM — N40.1 BENIGN PROSTATIC HYPERPLASIA WITH LOWER URINARY TRACT SYMPTOMS: Status: ACTIVE | Noted: 2023-12-08

## 2023-12-08 PROBLEM — Z87.39 PERSONAL HISTORY OF OTHER DISEASES OF THE MUSCULOSKELETAL SYSTEM AND CONNECTIVE TISSUE: Status: ACTIVE | Noted: 2023-12-08

## 2023-12-08 PROBLEM — N18.32 CHRONIC KIDNEY DISEASE (CKD) STAGE G3B/A1, MODERATELY DECREASED GLOMERULAR FILTRATION RATE (GFR) BETWEEN 30-44 ML/MIN/1.73 SQUARE METER AND ALBUMINURIA CREATININE RATIO LESS THAN 30 MG/G (CMS* (MULTI): Status: ACTIVE | Noted: 2023-12-08

## 2023-12-08 PROBLEM — Z87.442 PERSONAL HISTORY OF URINARY CALCULI: Status: ACTIVE | Noted: 2023-12-08

## 2023-12-08 PROBLEM — Z86.39 PERSONAL HISTORY OF OTHER ENDOCRINE, NUTRITIONAL AND METABOLIC DISEASE: Status: ACTIVE | Noted: 2023-12-08

## 2023-12-08 PROBLEM — I25.10 ATHEROSCLEROSIS OF NATIVE CORONARY ARTERY OF NATIVE HEART WITHOUT ANGINA PECTORIS: Status: ACTIVE | Noted: 2023-12-08

## 2023-12-08 PROBLEM — Z86.69 PERSONAL HISTORY OF OTHER DISEASES OF THE NERVOUS SYSTEM AND SENSE ORGANS: Status: ACTIVE | Noted: 2023-12-08

## 2023-12-13 NOTE — PROGRESS NOTES
Chief Complaint/Reason for Visit:   2 week follow up     History Of Present Illness:    Mr. Trujillo is coming in today as a 2-week cardiovascular follow-up.  Patient was recently in to see Dr. Nassar due to hypertensive urgency.  At that appointment, his blood pressure was suboptimally controlled and he was advised to take his diuretic routinely, losartan was increased, and he was scheduled for follow-up today.  We have followed him previously for chronic diastolic heart failure, coronary artery disease with CABG in 2000, atrial fibrillation, hypertension, and hyperlipidemia.  He also has diabetes and CKD.    Patient reports that he did make his medication adjustments as instructed.  Initially he was checking home blood pressures and was still getting high readings.  This past week, he saw his nephrologist and his systolic was in the 120s.  Blood pressure today in the office is 118/76.  Patient denies any chest pain, pressure, palpitations, orthopnea, or edema.  He has not noticed any lightheadedness.    Past Medical History:  He has a past medical history of Activated protein C resistance (CMS/[facility]), Acute diastolic (congestive) heart failure (CMS/[facility]) (10/29/2018), Atherosclerosis of native coronary artery of native heart without angina pectoris (04/05/2023), Benign essential hypertension (04/05/2023), Benign prostatic hyperplasia with lower urinary tract symptoms (11/15/2019), Chronic diastolic heart failure, NYHA class 2 (CMS/[facility]) (04/05/2023), Chronic kidney disease (CKD) stage G3b/A1, moderately decreased glomerular filtration rate (GFR) between 30-44 mL/min/1.73 square meter and albuminuria creatinine ratio less than 30 mg/g (CMS/[facility]) (04/05/2023), Diabetes type 2, controlled (CMS/[facility]) (04/05/2023), History of depression (04/05/2023), Hyperlipemia (04/05/2023), Morbid obesity (CMS/[facility]) (04/05/2023), Obesity (BMI 30-39.9) (04/05/2023), KENNEY treated with BiPAP (04/05/2023), Paroxysmal atrial fibrillation  (CMS/[facility]) (04/05/2023), Personal history of Methicillin resistant Staphylococcus aureus infection, Personal history of other diseases of male genital organs (11/15/2019), Personal history of other diseases of the musculoskeletal system and connective tissue (11/06/2019), Personal history of other diseases of the musculoskeletal system and connective tissue, Personal history of other diseases of the musculoskeletal system and connective tissue, Personal history of other diseases of the nervous system and sense organs, Personal history of other diseases of the nervous system and sense organs, Personal history of other diseases of the respiratory system (11/15/2019), Personal history of other endocrine, nutritional and metabolic disease (11/15/2019), Personal history of other endocrine, nutritional and metabolic disease (11/15/2019), Personal history of urinary calculi, Right ventricular systolic dysfunction (04/05/2023), Type 2 diabetes mellitus without complications (CMS/HCC) (12/02/2019), Type 2 diabetes with nephropathy (CMS/HCC) (04/05/2023), and Venous stasis of lower extremity (04/05/2023).    Past Surgical History:  He has a past surgical history that includes Total hip arthroplasty (10/06/2016); Coronary artery bypass graft (10/06/2016); Cataract extraction (08/28/2017); Other surgical history (10/29/2018); Other surgical history (11/09/2019); Other surgical history (11/09/2019); Other surgical history (11/09/2019); and [place] tooth extraction.      Social History:  He reports that he has quit smoking. His smoking use included cigarettes. He does not have any smokeless tobacco history on file. He reports that he does not currently use alcohol. He reports current drug use. Frequency: 1.00 time per week. Drug: Marijuana.    Family History:  Family History   Problem Relation Name Age of Onset    Coronary artery disease Mother      Diabetes Mother      Coronary artery disease Father      Diabetes Father         "  Allergies:  Ace inhibitors and Atorvastatin    Medications:  Current Outpatient Medications   Medication Instructions    alpha lipoic acid 800 mg, oral, 2 times daily    amLODIPine (NORVASC) 5 mg, oral, Daily    BD Ultra-Fine Mini Pen Needle 31 gauge x 3/16\" needle     coenzyme Q-10 200 mg capsule oral    Eliquis 5 mg tablet 1 tablet, oral, 2 times daily    ezetimibe (Zetia) 10 mg tablet 1 tablet, oral, Daily    famotidine (Pepcid) 20 mg tablet 1 tablet, oral, Every 12 hours    Farxiga 10 mg 1 tablet, oral, Daily    ferrous gluconate 236 mg (27 mg iron) tablet 1 tablet, oral, Daily    furosemide (LASIX) 40 mg, oral, Daily    glimepiride (Amaryl) 4 mg tablet 1 tablet, oral, 2 times daily    insulin glargine (Toujeo Max U-300 SoloStar) 300 unit/mL (3 mL) injection subcutaneous    losartan (COZAAR) 50 mg, oral, Daily    miscellaneous medical supply (Blood Pressure Cuff) misc Daily home blood pressure monitoring    nitroglycerin (NITROSTAT) 0.4 mg, sublingual, Every 5 min PRN    omega-3 acid ethyl esters (LOVAZA) 1 g, oral, 2 times daily    rosuvastatin (CRESTOR) 40 mg, oral, Daily    turmeric root extract 500 mg tablet oral    vitamin D3-vitamin K2, MK4, (K2 Plus D3) 1,000-100 unit-mcg tablet 1 tablet, oral, Daily       Review of Systems:  Review of Systems   Constitutional: Negative. Negative for decreased appetite and malaise/fatigue.   HENT: Negative.     Eyes:  Negative for blurred vision and visual disturbance.   Cardiovascular:  Negative for chest pain, dyspnea on exertion, irregular heartbeat, leg swelling, orthopnea, palpitations and syncope.   Respiratory: Negative.  Negative for cough and shortness of breath.    Musculoskeletal:  Negative for arthritis and falls.   Gastrointestinal: Negative.    Neurological:  Negative for focal weakness and light-headedness.   Psychiatric/Behavioral:  Negative for depression and memory loss.         Vitals  Visit Vitals  /76   Pulse 62   Ht 1.791 m (5' 10.5\")   Wt " 115 kg (254 lb)   SpO2 96%   BMI 35.93 kg/m²   Smoking Status Former   BSA 2.39 m²        Physical Exam:  Constitutional:       Appearance: Normal appearance.   Neck:      Vascular: No carotid bruit or JVD.   Cardiovascular:      Rate and Rhythm: Normal rate and regular rhythm.      Heart sounds: Normal heart sounds, S1 normal and S2 normal. No murmur heard.  Pulmonary:      Effort: Pulmonary effort is normal.      Breath sounds: Normal breath sounds.   Abdominal:      General: Abdomen is flat. Bowel sounds are normal.      Palpations: Abdomen is soft.   Musculoskeletal:      Right lower leg: Trace edema     Left lower leg:  Trace edema  Skin:     General: Skin is warm.   Neurological:      Mental Status: He is alert. Mental status is at baseline.   Psychiatric:         Mood and Affect: Mood normal.         Behavior: Behavior normal.      Last Labs:  CBC -  Lab Results   Component Value Date    WBC 5.0 11/13/2023    HGB 14.7 11/13/2023    HCT 44.4 11/13/2023    MCV 94 11/13/2023     11/13/2023     Lab Results   Component Value Date    GLUCOSE 80 10/09/2023    CALCIUM 9.4 10/09/2023     10/09/2023    K 4.6 10/09/2023    CO2 23 10/09/2023     (H) 10/09/2023    BUN 31 (H) 10/09/2023    CREATININE 1.97 (H) 10/09/2023      CMP -  Lab Results   Component Value Date    CALCIUM 9.4 10/09/2023    PHOS 3.7 10/09/2023    PROT 7.1 08/24/2022    ALBUMIN 4.6 10/09/2023    AST 18 11/13/2023    ALT 15 11/13/2023    ALKPHOS 70 08/24/2022    BILITOT 0.4 08/24/2022       LIPID PANEL -   Lab Results   Component Value Date    CHOL 111 10/09/2023    TRIG 80 10/09/2023    HDL 40.7 10/09/2023    CHHDL 2.7 10/09/2023    LDLF 51 08/24/2022    VLDL 16 10/09/2023    NHDL 70 10/09/2023       Lab Results   Component Value Date    BNP 86 08/24/2022    HGBA1C 7.2 (H) 10/09/2023       Last Cardiology Tests:    Echo:6-12-23  CONCLUSIONS:  1. Left ventricular systolic function is normal with a 60% estimated ejection fraction.  2.  There is reduced right ventricular systolic function.       Lab review: I have personally reviewed the laboratory result(s)     Assessment/Plan:  Chronic diastolic heart failure/RVD: Patient appears to be well compensated.  He should continue on furosemide 40 mg daily.  I encouraged her to be on a heart healthy low-sodium diet.    Hypertension: Blood pressure today shows better control.  He will continue on amlodipine 5 mg daily, furosemide 40 mg daily, and losartan 50 mg daily.  He has chronic kidney disease and recently saw his nephrologist.  Most recent creatinine in October was 1.97, it looks like his baseline is usually a bit higher.  Patient will continue on his current regimen.    ASHD: Patient has a prior history of coronary artery bypass grafting done in 2000.  Stress testing in 2021 showed intermediate probably of ischemia and he has been treated medically.  Patient is currently angina class 0 and will continue on medical therapy.    Paroxysmal atrial fibrillation: Patient appears to be maintaining sinus rhythm without the need for antiarrhythmic medication.  He, at one point, was treated with sotalol then later changed to Multaq which was discontinued after recurrence of atrial fibrillation.  Patient on exam is regular.  He is anticoagulated with Eliquis due to an elevated [id number] score.  Patient is not having any abnormal bleeding or bruising and will continue on anticoagulation.    Patient currently has scheduled follow-up appointments with Dr. Nassar in February, May, and December (2024).  Patient instructed to call with any cardiovascular complaints. All questions were answered.       Dragon dictation was utilized to create this document. Quite often unanticipated grammatical, syntax,  and other interpretive errors are inadvertently transcribed by the computer software.  Please disregard these errors.  Please excuse any errors that have escaped final proofreading.                          Marysol Holt, APRN-CNP

## 2023-12-18 ENCOUNTER — OFFICE VISIT (OUTPATIENT)
Dept: CARDIOLOGY | Facility: CLINIC | Age: 73
End: 2023-12-18
Payer: MEDICARE

## 2023-12-18 VITALS
WEIGHT: 254 LBS | BODY MASS INDEX: 35.56 KG/M2 | HEART RATE: 62 BPM | DIASTOLIC BLOOD PRESSURE: 76 MMHG | HEIGHT: 71 IN | SYSTOLIC BLOOD PRESSURE: 118 MMHG | OXYGEN SATURATION: 96 %

## 2023-12-18 DIAGNOSIS — E78.5 HYPERLIPIDEMIA, UNSPECIFIED HYPERLIPIDEMIA TYPE: ICD-10-CM

## 2023-12-18 DIAGNOSIS — I10 BENIGN ESSENTIAL HYPERTENSION: ICD-10-CM

## 2023-12-18 DIAGNOSIS — I25.10 ATHEROSCLEROSIS OF NATIVE CORONARY ARTERY OF NATIVE HEART WITHOUT ANGINA PECTORIS: Primary | ICD-10-CM

## 2023-12-18 DIAGNOSIS — I50.32 CHRONIC DIASTOLIC HEART FAILURE, NYHA CLASS 2 (MULTI): ICD-10-CM

## 2023-12-18 DIAGNOSIS — I48.0 PAROXYSMAL ATRIAL FIBRILLATION (MULTI): ICD-10-CM

## 2023-12-18 PROCEDURE — 1159F MED LIST DOCD IN RCRD: CPT | Performed by: NURSE PRACTITIONER

## 2023-12-18 PROCEDURE — 1126F AMNT PAIN NOTED NONE PRSNT: CPT | Performed by: NURSE PRACTITIONER

## 2023-12-18 PROCEDURE — 3048F LDL-C <100 MG/DL: CPT | Performed by: NURSE PRACTITIONER

## 2023-12-18 PROCEDURE — 3051F HG A1C>EQUAL 7.0%<8.0%: CPT | Performed by: NURSE PRACTITIONER

## 2023-12-18 PROCEDURE — 3062F POS MACROALBUMINURIA REV: CPT | Performed by: NURSE PRACTITIONER

## 2023-12-18 PROCEDURE — 99213 OFFICE O/P EST LOW 20 MIN: CPT | Performed by: NURSE PRACTITIONER

## 2023-12-18 PROCEDURE — 3078F DIAST BP <80 MM HG: CPT | Performed by: NURSE PRACTITIONER

## 2023-12-18 PROCEDURE — 3074F SYST BP LT 130 MM HG: CPT | Performed by: NURSE PRACTITIONER

## 2023-12-18 PROCEDURE — 4010F ACE/ARB THERAPY RXD/TAKEN: CPT | Performed by: NURSE PRACTITIONER

## 2023-12-18 PROCEDURE — 1160F RVW MEDS BY RX/DR IN RCRD: CPT | Performed by: NURSE PRACTITIONER

## 2023-12-18 ASSESSMENT — ENCOUNTER SYMPTOMS
CONSTITUTIONAL NEGATIVE: 1
PALPITATIONS: 0
SHORTNESS OF BREATH: 0
COUGH: 0
GASTROINTESTINAL NEGATIVE: 1
FALLS: 0
DYSPNEA ON EXERTION: 0
LIGHT-HEADEDNESS: 0
FOCAL WEAKNESS: 0
DEPRESSION: 0
IRREGULAR HEARTBEAT: 0
MEMORY LOSS: 0
SYNCOPE: 0
BLURRED VISION: 0
ORTHOPNEA: 0
DECREASED APPETITE: 0
RESPIRATORY NEGATIVE: 1

## 2023-12-18 NOTE — PATIENT INSTRUCTIONS
Continue on current meds  Heart healthy, low sodium diet  Mediterranean diet is recommended  Continue to monitor home BP readings  Follow up with Dr Nassar in Feb as planned

## 2023-12-19 DIAGNOSIS — Z79.4 CONTROLLED TYPE 2 DIABETES MELLITUS WITHOUT COMPLICATION, WITH LONG-TERM CURRENT USE OF INSULIN (MULTI): Primary | ICD-10-CM

## 2023-12-19 DIAGNOSIS — E11.9 CONTROLLED TYPE 2 DIABETES MELLITUS WITHOUT COMPLICATION, WITH LONG-TERM CURRENT USE OF INSULIN (MULTI): Primary | ICD-10-CM

## 2023-12-19 RX ORDER — PEN NEEDLE, DIABETIC 31 GX5/16"
1 NEEDLE, DISPOSABLE MISCELLANEOUS NIGHTLY
Qty: 100 EACH | Refills: 3 | Status: SHIPPED | OUTPATIENT
Start: 2023-12-19 | End: 2024-12-18

## 2024-01-04 DIAGNOSIS — I10 BENIGN ESSENTIAL HYPERTENSION: ICD-10-CM

## 2024-01-06 RX ORDER — AMLODIPINE BESYLATE 5 MG/1
5 TABLET ORAL DAILY
Qty: 90 TABLET | Refills: 3 | Status: SHIPPED | OUTPATIENT
Start: 2024-01-06 | End: 2024-02-27 | Stop reason: SDUPTHER

## 2024-01-31 ENCOUNTER — OFFICE VISIT (OUTPATIENT)
Dept: ENDOCRINOLOGY | Facility: CLINIC | Age: 74
End: 2024-01-31
Payer: MEDICARE

## 2024-01-31 VITALS
SYSTOLIC BLOOD PRESSURE: 142 MMHG | WEIGHT: 249 LBS | HEART RATE: 66 BPM | DIASTOLIC BLOOD PRESSURE: 72 MMHG | BODY MASS INDEX: 35.65 KG/M2 | HEIGHT: 70 IN

## 2024-01-31 DIAGNOSIS — E11.21 TYPE 2 DIABETES WITH NEPHROPATHY (MULTI): Primary | ICD-10-CM

## 2024-01-31 LAB — POC HEMOGLOBIN A1C: 7.9 % (ref 4.2–6.5)

## 2024-01-31 PROCEDURE — 83036 HEMOGLOBIN GLYCOSYLATED A1C: CPT | Performed by: INTERNAL MEDICINE

## 2024-01-31 PROCEDURE — 99214 OFFICE O/P EST MOD 30 MIN: CPT | Performed by: INTERNAL MEDICINE

## 2024-01-31 PROCEDURE — 1126F AMNT PAIN NOTED NONE PRSNT: CPT | Performed by: INTERNAL MEDICINE

## 2024-01-31 PROCEDURE — 1159F MED LIST DOCD IN RCRD: CPT | Performed by: INTERNAL MEDICINE

## 2024-01-31 PROCEDURE — 3077F SYST BP >= 140 MM HG: CPT | Performed by: INTERNAL MEDICINE

## 2024-01-31 PROCEDURE — 3078F DIAST BP <80 MM HG: CPT | Performed by: INTERNAL MEDICINE

## 2024-01-31 PROCEDURE — 1160F RVW MEDS BY RX/DR IN RCRD: CPT | Performed by: INTERNAL MEDICINE

## 2024-01-31 PROCEDURE — 4010F ACE/ARB THERAPY RXD/TAKEN: CPT | Performed by: INTERNAL MEDICINE

## 2024-01-31 RX ORDER — DULAGLUTIDE 3 MG/.5ML
3 INJECTION, SOLUTION SUBCUTANEOUS
Qty: 4 EACH | Refills: 0 | Status: SHIPPED | OUTPATIENT
Start: 2024-01-31 | End: 2024-03-01

## 2024-01-31 ASSESSMENT — ENCOUNTER SYMPTOMS
BACK PAIN: 1
ARTHRALGIAS: 1

## 2024-01-31 NOTE — PATIENT INSTRUCTIONS
Thank you for choosing [facility]  for your health care needs.  If you have any questions, concerns or medical needs, please feel free to contact our office at [phone].    Please ensure you complete your blood work one week before the next scheduled appointment.    To increase Trulicity to 3mg SQ Once weekly for one month and then 4.5mg subcutaneous once weekly   To continue Glimepiride 4mg once daily  To continue Toujeo 22 units SQ at bedtime  To continue Farxiga 10mg once daily   Follow up in 6 months with glucose log

## 2024-01-31 NOTE — PROGRESS NOTES
"Subjective   Waldemar Trujillo is a 73 y.o. male who presents for follow-up of Type 2 diabetes mellitus. The initial diagnosis of diabetes was made in 2007 .     He has been fighting depression due to multiple deaths of family members last year.  He is not on any medications.      He had oral surgery and his SBP was elevated as high as the 200mmHg range.      Known complications due to diabetes included CAD s/p CABG and chronic kidney disease    Cardiovascular risk factors include advanced age (older than 55 for men, 65 for women), diabetes mellitus, male gender, and obesity (BMI >= 30 kg/m2). The patient is on an ACE inhibitor or angiotensin II receptor blocker.  The patient has not been previously hospitalized due to diabetic ketoacidosis.     Current symptoms/problems include none. His clinical course has been stable.       The patient is currently checking the blood glucose at least one time per day.    Patient is using: glucometer  Today 118mg/dL   Fasting was over 200mg/dL after being switched to Toujeo     Hypoglycemia frequency: Denies   Hypoglycemia awareness: N/A     Current Medication Regimen  Toujeo 22 units SQ daily   Glimepiride 4mg once daily  Trulicity 1.5mg SQ once weekly   Farxiga 10mg once daily      MEALS - does not eat if not hungry      Exercise regimen - walking dogs [place] property   He sees chiropractor weekly   His maximum weight was 365 lbs   Walks 2 dogs separately     Review of Systems   Cardiovascular:  Negative for chest pain.   Musculoskeletal:  Positive for arthralgias (Knee, hip, shoulders) and back pain.       Objective   /72 (BP Location: Left arm, Patient Position: Sitting, BP Cuff Size: Adult)   Pulse 66   Ht 1.778 m (5' 10\")   Wt 113 kg (249 lb)   BMI 35.73 kg/m²   Physical Exam  Vitals and nursing note reviewed.   Constitutional:       General: He is not in acute distress.     Appearance: Normal appearance. He is obese.   HENT:      Head: Normocephalic and " atraumatic.      Nose: Nose normal.      Mouth/Throat:      Mouth: Mucous membranes are moist.   Eyes:      Extraocular Movements: Extraocular movements intact.   Cardiovascular:      Rate and Rhythm: Normal rate and regular rhythm.   Pulmonary:      Effort: Pulmonary effort is normal.      Breath sounds: Normal breath sounds.   Musculoskeletal:         General: Normal range of motion.   Skin:     General: Skin is warm.   Neurological:      Mental Status: He is alert and oriented to person, place, and time.   Psychiatric:         Mood and Affect: Mood normal.       Lab Review  Glucose (mg/dL)   Date Value   10/09/2023 80   10/09/2023 78   06/01/2023 108 (H)   02/24/2023 118 (H)   11/18/2022 112 (H)     Hemoglobin A1C (%)   Date Value   10/09/2023 7.2 (H)   02/24/2023 7.1 (A)   08/24/2022 6.7 (A)   08/24/2022 6.7 (A)     Bicarbonate (mmol/L)   Date Value   10/09/2023 23   10/09/2023 23   06/01/2023 24   02/24/2023 25   11/18/2022 29     Urea Nitrogen (mg/dL)   Date Value   10/09/2023 31 (H)   10/09/2023 32 (H)   06/01/2023 30 (H)   02/24/2023 46 (H)   11/18/2022 28 (H)     Creatinine (mg/dL)   Date Value   10/09/2023 1.97 (H)   10/09/2023 2.04 (H)   06/01/2023 1.89 (H)   02/24/2023 2.72 (H)   11/18/2022 2.39 (H)     Health Maintenance:   Foot Exam: January 24, 2023  Eye Exam: 2022  Urine Albumin:  October 9, 2023    Assessment/Plan   73-year-old male with past medical history of type 2 diabetes, coronary artery disease status post CABG, CKD stage III, obesity, hypertension and hyperlipidemia presents for follow-up for type 2 diabetes.  He is noted to be on a statin. He is noted to be on an ARB. His blood pressure is elevated above  goal today.     Type 2 diabetes with nephropathy (CMS/[facility])  To increase Trulicity to 3mg SQ Once weekly for one month and then 4.5mg subcutaneous once weekly   To continue Glimepiride 4mg once daily  To continue Toujeo 22 units SQ at bedtime  To continue Farxiga 10mg once daily   Counseled  that the goal A1C should be 7% or less  Counseled that the ideal BP is less than 140/90 or lower for diabetics   Counseled glycemic control is warranted to prevent microvascular complications  Please rotate insulin injection sites  Continue to be physically active  Please stick to a low carb diet   Follow up in 6 months with glucose log and repeat labs

## 2024-02-05 RX ORDER — DULAGLUTIDE 4.5 MG/.5ML
4.5 INJECTION, SOLUTION SUBCUTANEOUS
Qty: 4 PEN | Refills: 5 | Status: SHIPPED | OUTPATIENT
Start: 2024-02-05 | End: 2024-04-15 | Stop reason: SDUPTHER

## 2024-02-05 NOTE — ASSESSMENT & PLAN NOTE
To increase Trulicity to 3mg SQ Once weekly for one month and then 4.5mg subcutaneous once weekly   To continue Glimepiride 4mg once daily  To continue Toujeo 22 units SQ at bedtime  To continue Farxiga 10mg once daily   Counseled that the goal A1C should be 7% or less  Counseled that the ideal BP is less than 140/90 or lower for diabetics   Counseled glycemic control is warranted to prevent microvascular complications  Please rotate insulin injection sites  Continue to be physically active  Please stick to a low carb diet   Follow up in 6 months with glucose log and repeat labs

## 2024-02-14 DIAGNOSIS — Z79.4 CONTROLLED TYPE 2 DIABETES MELLITUS WITHOUT COMPLICATION, WITH LONG-TERM CURRENT USE OF INSULIN (MULTI): Primary | ICD-10-CM

## 2024-02-14 DIAGNOSIS — E11.9 CONTROLLED TYPE 2 DIABETES MELLITUS WITHOUT COMPLICATION, WITH LONG-TERM CURRENT USE OF INSULIN (MULTI): Primary | ICD-10-CM

## 2024-02-14 NOTE — TELEPHONE ENCOUNTER
Patient notified of dapagliflozin (farxiga) no longer being formulary. Advised to contact [facility] for preferred brand. Letter scanned 2/14/24

## 2024-02-15 ENCOUNTER — APPOINTMENT (OUTPATIENT)
Dept: CARDIOLOGY | Facility: CLINIC | Age: 74
End: 2024-02-15
Payer: MEDICARE

## 2024-02-15 RX ORDER — DAPAGLIFLOZIN 10 MG/1
10 TABLET, FILM COATED ORAL DAILY
Qty: 90 TABLET | Refills: 1 | Status: SHIPPED | OUTPATIENT
Start: 2024-02-15 | End: 2024-08-13

## 2024-02-15 RX ORDER — INSULIN DEGLUDEC 100 U/ML
22 INJECTION, SOLUTION SUBCUTANEOUS NIGHTLY
Qty: 15 EACH | Refills: 1 | Status: SHIPPED | OUTPATIENT
Start: 2024-02-15 | End: 2024-08-13

## 2024-02-15 NOTE — TELEPHONE ENCOUNTER
Valentin SIMEON is formulary     Patient's wife states Tresiba is now a formulary brand and would prefer that in place of Toujeo    Patients preferred pharmacy is [facility]

## 2024-02-16 DIAGNOSIS — I48.0 PAROXYSMAL ATRIAL FIBRILLATION (MULTI): Primary | ICD-10-CM

## 2024-02-16 DIAGNOSIS — I25.10 ATHEROSCLEROSIS OF NATIVE CORONARY ARTERY OF NATIVE HEART, UNSPECIFIED WHETHER ANGINA PRESENT: ICD-10-CM

## 2024-02-16 RX ORDER — FUROSEMIDE 40 MG/1
40 TABLET ORAL DAILY
Qty: 90 TABLET | Refills: 1 | Status: CANCELLED | OUTPATIENT
Start: 2024-02-16 | End: 2024-08-14

## 2024-02-16 RX ORDER — ROSUVASTATIN CALCIUM 40 MG/1
40 TABLET, COATED ORAL DAILY
Qty: 90 TABLET | Refills: 1 | Status: CANCELLED | OUTPATIENT
Start: 2024-02-16 | End: 2024-08-14

## 2024-02-16 RX ORDER — AMLODIPINE BESYLATE 5 MG/1
5 TABLET ORAL DAILY
Qty: 90 TABLET | Refills: 1 | Status: CANCELLED | OUTPATIENT
Start: 2024-02-16 | End: 2024-08-14

## 2024-02-16 RX ORDER — EZETIMIBE 10 MG/1
10 TABLET ORAL DAILY
Qty: 90 TABLET | Refills: 1 | Status: CANCELLED | OUTPATIENT
Start: 2024-02-16 | End: 2024-08-14

## 2024-02-16 RX ORDER — LOSARTAN POTASSIUM 25 MG/1
50 TABLET ORAL DAILY
Qty: 180 TABLET | Refills: 1 | Status: CANCELLED | OUTPATIENT
Start: 2024-02-16 | End: 2024-08-14

## 2024-02-16 NOTE — TELEPHONE ENCOUNTER
----- Message from Debra Madrigal sent at 2/16/2024 12:49 PM EST -----  Regarding: Med Refill  Eliguis 5mg sent to [facility]

## 2024-02-16 NOTE — TELEPHONE ENCOUNTER
----- Message from Judith Quinn sent at 2/15/2024  1:32 PM EST -----  Regarding: med refill  Only has enough rosuvastatin for the rest of this week, he will need refilled before his next appointment. He needs it sent to [facility] in [place]

## 2024-02-17 RX ORDER — ROSUVASTATIN CALCIUM 40 MG/1
40 TABLET, COATED ORAL DAILY
Qty: 30 TABLET | Refills: 0 | Status: SHIPPED | OUTPATIENT
Start: 2024-02-17 | End: 2024-02-27 | Stop reason: SDUPTHER

## 2024-02-27 ENCOUNTER — OFFICE VISIT (OUTPATIENT)
Dept: CARDIOLOGY | Facility: CLINIC | Age: 74
End: 2024-02-27
Payer: MEDICARE

## 2024-02-27 VITALS
BODY MASS INDEX: 35.36 KG/M2 | SYSTOLIC BLOOD PRESSURE: 120 MMHG | HEART RATE: 69 BPM | WEIGHT: 247 LBS | HEIGHT: 70 IN | DIASTOLIC BLOOD PRESSURE: 74 MMHG

## 2024-02-27 DIAGNOSIS — I50.30: ICD-10-CM

## 2024-02-27 DIAGNOSIS — I48.0 PAROXYSMAL ATRIAL FIBRILLATION (MULTI): Primary | ICD-10-CM

## 2024-02-27 DIAGNOSIS — I25.10 ATHEROSCLEROSIS OF NATIVE CORONARY ARTERY OF NATIVE HEART WITHOUT ANGINA PECTORIS: ICD-10-CM

## 2024-02-27 DIAGNOSIS — R94.31 ABNORMAL EKG: ICD-10-CM

## 2024-02-27 DIAGNOSIS — G47.33 OBSTRUCTIVE SLEEP APNEA SYNDROME: ICD-10-CM

## 2024-02-27 DIAGNOSIS — I10 BENIGN ESSENTIAL HYPERTENSION: ICD-10-CM

## 2024-02-27 DIAGNOSIS — I50.32 CHRONIC DIASTOLIC HEART FAILURE, NYHA CLASS 2 (MULTI): ICD-10-CM

## 2024-02-27 DIAGNOSIS — I48.11 LONGSTANDING PERSISTENT ATRIAL FIBRILLATION (MULTI): ICD-10-CM

## 2024-02-27 DIAGNOSIS — I25.10 ATHEROSCLEROSIS OF NATIVE CORONARY ARTERY OF NATIVE HEART, UNSPECIFIED WHETHER ANGINA PRESENT: ICD-10-CM

## 2024-02-27 DIAGNOSIS — E78.5 HYPERLIPIDEMIA, UNSPECIFIED HYPERLIPIDEMIA TYPE: ICD-10-CM

## 2024-02-27 PROBLEM — I50.31 ACUTE DIASTOLIC (CONGESTIVE) HEART FAILURE (MULTI): Status: RESOLVED | Noted: 2023-12-08 | Resolved: 2024-02-27

## 2024-02-27 PROCEDURE — 93000 ELECTROCARDIOGRAM COMPLETE: CPT | Performed by: INTERNAL MEDICINE

## 2024-02-27 PROCEDURE — 3074F SYST BP LT 130 MM HG: CPT | Performed by: INTERNAL MEDICINE

## 2024-02-27 PROCEDURE — 3078F DIAST BP <80 MM HG: CPT | Performed by: INTERNAL MEDICINE

## 2024-02-27 PROCEDURE — 1159F MED LIST DOCD IN RCRD: CPT | Performed by: INTERNAL MEDICINE

## 2024-02-27 PROCEDURE — 1126F AMNT PAIN NOTED NONE PRSNT: CPT | Performed by: INTERNAL MEDICINE

## 2024-02-27 PROCEDURE — 99214 OFFICE O/P EST MOD 30 MIN: CPT | Performed by: INTERNAL MEDICINE

## 2024-02-27 PROCEDURE — 1160F RVW MEDS BY RX/DR IN RCRD: CPT | Performed by: INTERNAL MEDICINE

## 2024-02-27 PROCEDURE — 4010F ACE/ARB THERAPY RXD/TAKEN: CPT | Performed by: INTERNAL MEDICINE

## 2024-02-27 RX ORDER — ROSUVASTATIN CALCIUM 40 MG/1
40 TABLET, COATED ORAL DAILY
Qty: 90 TABLET | Refills: 3 | Status: SHIPPED | OUTPATIENT
Start: 2024-02-27

## 2024-02-27 RX ORDER — LOSARTAN POTASSIUM 25 MG/1
50 TABLET ORAL DAILY
Qty: 90 TABLET | Refills: 3 | Status: SHIPPED | OUTPATIENT
Start: 2024-02-27

## 2024-02-27 RX ORDER — AMLODIPINE BESYLATE 5 MG/1
5 TABLET ORAL DAILY
Qty: 90 TABLET | Refills: 3 | Status: SHIPPED | OUTPATIENT
Start: 2024-02-27

## 2024-02-27 NOTE — PROGRESS NOTES
Chief Complaint:   Follow-up     History Of Present Illness:    Waldemar Trujillo is a 73 y.o. male with History of chronic diastolic heart failure, coronary artery disease status post bypass done in 2000, paroxysmal atrial fibrillation, which has been persistent since 2023.  (Used to be on antiarrhythmic drug therapy with sotalol(CHANGED TO MULTAQ in 2022 due to worsening renal function), and had a symptomatic recurrence of atrial fibrillation in 2023 leading to discontinuation of antiarrhythmic therapy.  Warfarin anticoagulation(now on a DOAC), chronic kidney disease, hypertension, dyslipidemia, diabetes mellitus, and obesity.   Nuclear stress done in August 2021 showed a small reversible perfusion defect in the anterolateral wall suggesting an area of ischemia, intermediate probability of ischemia, calculated ejection fraction 74%. Echocardiogram done in November 2018 showed normal left ventricular systolic function with an ejection fraction 55%, grade 2 diastolic dysfunction, dilated right ventricle with moderately reduced right ventricular systolic function, no significant valve abnormalities.   Echo in 2023 June showed normal LV function mild RV dysfunction.  The patient reports that he has been feeling reasonably well from the cardiac perspective. He does not have chest pain or shortness of breath with activities. He states that his heart failure has been managed well with a low-dose diuretics every other day for years.. He has not been hospitalized with heart failure in the past 5 years.      He is ECG today shows atrial fibrillation.  EKG from November 2022 showed that he was sinus rhythm at that time .  However he has been persistently in atrial fibrillation since 2023.  His EKG today shows abnormal T waves inferiorly and laterally which where also seen in last EKG from December 6, 2023 but not in August 2023.    We tried to order a home sleep study last year however this could not be accomplished as nobody  "called him back or  his phone at the sleep center.     Last Recorded Vitals:  Vitals:    02/27/24 1306   BP: 120/74   Pulse: 69   Weight: 112 kg (247 lb)   Height: 1.778 m (5' 10\")       Past Medical History:  He has a past medical history of Activated protein C resistance (CMS/[facility]), Acute diastolic (congestive) heart failure (CMS/[facility]) (10/29/2018), Atherosclerosis of native coronary artery of native heart without angina pectoris (04/05/2023), Benign essential hypertension (04/05/2023), Benign prostatic hyperplasia with lower urinary tract symptoms (11/15/2019), Chronic diastolic heart failure, NYHA class 2 (CMS/[facility]) (04/05/2023), Chronic kidney disease (CKD) stage G3b/A1, moderately decreased glomerular filtration rate (GFR) between 30-44 mL/min/1.73 square meter and albuminuria creatinine ratio less than 30 mg/g (CMS/HCC) (04/05/2023), Diabetes type 2, controlled (CMS/HCC) (04/05/2023), History of depression (04/05/2023), Hyperlipemia (04/05/2023), Morbid obesity (CMS/[facility]) (04/05/2023), Obesity (BMI 30-39.9) (04/05/2023), KENNEY treated with BiPAP (04/05/2023), Paroxysmal atrial fibrillation (CMS/[facility]) (04/05/2023), Personal history of Methicillin resistant Staphylococcus aureus infection, Personal history of other diseases of male genital organs (11/15/2019), Personal history of other diseases of the musculoskeletal system and connective tissue (11/06/2019), Personal history of other diseases of the musculoskeletal system and connective tissue, Personal history of other diseases of the musculoskeletal system and connective tissue, Personal history of other diseases of the nervous system and sense organs, Personal history of other diseases of the nervous system and sense organs, Personal history of other diseases of the respiratory system (11/15/2019), Personal history of other endocrine, nutritional and metabolic disease (11/15/2019), Personal history of other endocrine, nutritional and metabolic disease " "(11/15/2019), Personal history of urinary calculi, Right ventricular systolic dysfunction (04/05/2023), Type 2 diabetes mellitus without complications (CMS/[facility]) (12/02/2019), Type 2 diabetes with nephropathy (CMS/[facility]) (04/05/2023), and Venous stasis of lower extremity (04/05/2023).    Past Surgical History:  He has a past surgical history that includes Total hip arthroplasty (10/06/2016); Coronary artery bypass graft (10/06/2016); Cataract extraction (08/28/2017); Other surgical history (10/29/2018); Other surgical history (11/09/2019); Other surgical history (11/09/2019); Other surgical history (11/09/2019); and [place] tooth extraction.      Social History:  He reports that he has quit smoking. His smoking use included cigarettes. He does not have any smokeless tobacco history on file. He reports that he does not currently use alcohol. He reports current drug use. Frequency: 1.00 time per week. Drug: Marijuana.    Family History:  Family History   Problem Relation Name Age of Onset    Coronary artery disease Mother      Diabetes Mother      Coronary artery disease Father      Diabetes Father          Allergies:  Ace inhibitors and Atorvastatin    Outpatient Medications:  Current Outpatient Medications   Medication Instructions    alpha lipoic acid 800 mg, oral, 2 times daily    amLODIPine (NORVASC) 5 mg, oral, Daily    BD Ultra-Fine Mini Pen Needle 31 gauge x 3/16\" needle 1 each, subcutaneous, Nightly    coenzyme Q-10 200 mg capsule oral    Eliquis 5 mg tablet 1 tablet, oral, 2 times daily    ezetimibe (Zetia) 10 mg tablet 1 tablet, oral, Daily    famotidine (Pepcid) 20 mg tablet 1 tablet, oral, Every 12 hours    Farxiga 10 mg, oral, Daily    ferrous gluconate 236 mg (27 mg iron) tablet 1 tablet, oral, Daily    furosemide (LASIX) 40 mg, oral, Daily    glimepiride (Amaryl) 4 mg tablet 1 tablet, oral, 2 times daily    insulin degludec (TRESIBA FLEXTOUCH) 22 Units, subcutaneous, Nightly    insulin glargine (Toujeo Max " U-300 SoloStar) 300 unit/mL (3 mL) injection subcutaneous    losartan (COZAAR) 50 mg, oral, Daily    miscellaneous medical supply (Blood Pressure Cuff) misc Daily home blood pressure monitoring    nitroglycerin (NITROSTAT) 0.4 mg, sublingual, Every 5 min PRN    omega-3 acid ethyl esters (LOVAZA) 1 g, oral, 2 times daily    rosuvastatin (CRESTOR) 40 mg, oral, Daily    Trulicity 3 mg, subcutaneous, Weekly    Trulicity 4.5 mg, subcutaneous, Weekly    turmeric root extract 500 mg tablet oral    vitamin D3-vitamin K2, MK4, (K2 Plus D3) 1,000-100 unit-mcg tablet 1 tablet, oral, Daily       Physical Exam:  Physical Exam  Vitals reviewed.   Constitutional:       Appearance: Normal appearance.   Neck:      Vascular: No carotid bruit or JVD.   Cardiovascular:      Rate and Rhythm: Normal rate. Rhythm irregularly irregular.      Heart sounds: Normal heart sounds, S1 normal and S2 normal. No murmur heard.  Pulmonary:      Effort: Pulmonary effort is normal.      Breath sounds: Normal breath sounds.   Abdominal:      General: Abdomen is flat. Bowel sounds are normal.      Palpations: Abdomen is soft.   Musculoskeletal:      Right lower leg: No edema.      Left lower leg: No edema.   Skin:     General: Skin is warm.   Neurological:      Mental Status: He is alert. Mental status is at baseline.   Psychiatric:         Mood and Affect: Mood normal.         Behavior: Behavior normal.           Last Labs:  CBC -  Lab Results   Component Value Date    WBC 5.0 11/13/2023    HGB 14.7 11/13/2023    HCT 44.4 11/13/2023    MCV 94 11/13/2023     11/13/2023       CMP -  Lab Results   Component Value Date    CALCIUM 9.4 10/09/2023    PHOS 3.7 10/09/2023    PROT 7.1 08/24/2022    ALBUMIN 4.6 10/09/2023    AST 18 11/13/2023    ALT 15 11/13/2023    ALKPHOS 70 08/24/2022    BILITOT 0.4 08/24/2022       LIPID PANEL -   Lab Results   Component Value Date    CHOL 111 10/09/2023    TRIG 80 10/09/2023    HDL 40.7 10/09/2023    CHHDL 2.7  "10/09/2023    LDLF 51 08/24/2022    VLDL 16 10/09/2023    NHDL 70 10/09/2023       RENAL FUNCTION PANEL -   Lab Results   Component Value Date    GLUCOSE 80 10/09/2023     10/09/2023    K 4.6 10/09/2023     (H) 10/09/2023    CO2 23 10/09/2023    ANIONGAP 13 10/09/2023    BUN 31 (H) 10/09/2023    CREATININE 1.97 (H) 10/09/2023    GFRMALE 37 (A) 06/01/2023    CALCIUM 9.4 10/09/2023    PHOS 3.7 10/09/2023    ALBUMIN 4.6 10/09/2023        Lab Results   Component Value Date    BNP 86 08/24/2022    HGBA1C 7.9 (A) 01/31/2024       Last Cardiology Tests:  ECG:  ECG 12 Lead 12/06/2023      Echo:  No results found for this or any previous visit from the past 1095 days.      Ejection Fractions:  No results found for: \"EF\"    Cath:  No results found for this or any previous visit from the past 1095 days.      Stress Test:  Nuclear Stress Test 08/18/2021      Cardiac Imaging:  No results found for this or any previous visit from the past 1095 days.          Assessment/Plan   1. Coronary artery disease  The patient has a history of coronary artery disease status post bypass done in 2000.  This appears stable as he denies any anginal chest discomfort.  Nuclear stress done in August 2021 showed a small reversible perfusion defect in the anterolateral wall suggesting an area of ischemia, intermediate probability of ischemia, calculated ejection fraction 74%. It is best to treat this medically in the absence of symptoms and in presence of significant renal dysfunction.  Echocardiogram done in November 2018 showed normal left ventricular systolic function with an ejection fraction 55%, grade 2 diastolic dysfunction, dilated right ventricle with moderately reduced right ventricular systolic function, no significant valve abnormalities.  Would continue with current medical therapy. Lipid profile is favorable. Because he is on oral anticoagulants I advised him to stop taking the aspirin.  He is not having any symptoms at " present but EKG appears abnormal.  I will check a limited echo if there is any new wall motion abnormality or LV dysfunction we will plan on ischemic evaluation.            2. Chronic diastolic heart failure/right ventricular systolic dysfunction  Patient has a history of chronic diastolic heart failure and did follow once with the heart failure clinic. He has not been there in a while. Symptoms have been well managed with a low-dose of diuretics every other day and he has not had any heart failure exacerbations for a while. He is NYHA class II.  Echocardiogram done in November 2018 and 2023 showed normal left ventricular systolic function with an ejection fraction 55%, grade 2 diastolic dysfunction, dilated right ventricle with moderately reduced right ventricular systolic function, no significant valve abnormalities.  The patient should continue his current heart failure medications.   Low-sodium diet.     3.  Longstanding persistent atrial fibrillation  Patient has a history of paroxysmal atrial fibrillation and has been on sotalol to reduce his burden of atrial fibrillation and NOAC for thromboembolism prophylaxis. However with worsening renal function and creatinine clearance of 33, we changed this to Multaq in 2022. With that he was maintaining sinus rhythm but more recently has had recurrence of atrial fibrillation. He seems to be asymptomatic.  We checked echo LV function is unchanged we continue with rate control.  We also discussed option of rhythm control with either amiodarone plus cardioversion or ablation. After risk-benefit alternative discussion of each approach he wants to proceed with rate control only for the time being and if symptoms change may change strategy.         4. CKD  Stable, recent blood work as noted in HPI.     5. Hypertension  Blood pressure currently well-controlled we will continue current regimen.     We will also screen him for sleep apnea which remains untreated at this point  although he may have gotten better with weight loss.  Home sleep study is being arranged.  He was unable to organize this last time we will reorder hopefully he will get a response from the sleep center at this time.     6. Dyslipidemia  Favorable continue current management.      Theresa Nassar MD

## 2024-03-06 ENCOUNTER — TELEPHONE (OUTPATIENT)
Dept: CARDIOLOGY | Facility: CLINIC | Age: 74
End: 2024-03-06
Payer: MEDICARE

## 2024-03-06 DIAGNOSIS — G47.33 OSA TREATED WITH BIPAP: ICD-10-CM

## 2024-03-06 DIAGNOSIS — E66.01 MORBID OBESITY (MULTI): ICD-10-CM

## 2024-03-06 DIAGNOSIS — G47.33 OBSTRUCTIVE SLEEP APNEA SYNDROME: Primary | ICD-10-CM

## 2024-03-13 ENCOUNTER — CLINICAL SUPPORT (OUTPATIENT)
Dept: SLEEP MEDICINE | Facility: CLINIC | Age: 74
End: 2024-03-13
Payer: MEDICARE

## 2024-03-13 DIAGNOSIS — G47.33 OBSTRUCTIVE SLEEP APNEA SYNDROME: ICD-10-CM

## 2024-03-13 PROCEDURE — 95806 SLEEP STUDY UNATT&RESP EFFT: CPT | Performed by: STUDENT IN AN ORGANIZED HEALTH CARE EDUCATION/TRAINING PROGRAM

## 2024-03-13 NOTE — PROGRESS NOTES
Type of Study: HOME SLEEP STUDY - NOMAD     The patient received equipment and instructions for use of the dax [employer] Nomad HSAT device. The patient was instructed how to apply the effort belts, cannula, thermistor. It was also explained how the Nomad and oximeter components work.  The patient was asked to record their sleep for an 8-hour period.     The patient was informed of their responsibility for the device and acknowledged this by signing the HSAT device contract. The patient was asked to return the device on 03/14/2024 by 10 AM to the Respiratory Care Department at [facility].     The patient was instructed to call 911 as usual for any medical- emergencies while at home.  The patient was also given a phone number for troubleshooting when using the device in case there were additional questions.

## 2024-03-14 ENCOUNTER — HOSPITAL ENCOUNTER (OUTPATIENT)
Dept: CARDIOLOGY | Facility: HOSPITAL | Age: 74
Discharge: HOME | End: 2024-03-14
Payer: MEDICARE

## 2024-03-14 DIAGNOSIS — E78.5 HYPERLIPIDEMIA, UNSPECIFIED HYPERLIPIDEMIA TYPE: ICD-10-CM

## 2024-03-14 DIAGNOSIS — I48.0 PAROXYSMAL ATRIAL FIBRILLATION (MULTI): ICD-10-CM

## 2024-03-14 DIAGNOSIS — I48.11 LONGSTANDING PERSISTENT ATRIAL FIBRILLATION (MULTI): ICD-10-CM

## 2024-03-14 DIAGNOSIS — R94.31 ABNORMAL EKG: ICD-10-CM

## 2024-03-14 DIAGNOSIS — G47.33 OBSTRUCTIVE SLEEP APNEA SYNDROME: ICD-10-CM

## 2024-03-14 DIAGNOSIS — I50.30: ICD-10-CM

## 2024-03-14 DIAGNOSIS — I50.32 CHRONIC DIASTOLIC HEART FAILURE, NYHA CLASS 2 (MULTI): ICD-10-CM

## 2024-03-14 DIAGNOSIS — I25.10 ATHEROSCLEROSIS OF NATIVE CORONARY ARTERY OF NATIVE HEART WITHOUT ANGINA PECTORIS: ICD-10-CM

## 2024-03-14 DIAGNOSIS — I10 BENIGN ESSENTIAL HYPERTENSION: ICD-10-CM

## 2024-03-14 DIAGNOSIS — I25.10 ATHEROSCLEROSIS OF NATIVE CORONARY ARTERY OF NATIVE HEART, UNSPECIFIED WHETHER ANGINA PRESENT: ICD-10-CM

## 2024-03-14 LAB
EJECTION FRACTION APICAL 4 CHAMBER: 64.4
EJECTION FRACTION: 59 %
GLOBAL LONGITUDINAL STRAIN: 11.5 %
LEFT ATRIUM VOLUME AREA LENGTH INDEX BSA: 26.7 ML/M2
LEFT VENTRICLE INTERNAL DIMENSION DIASTOLE: 3.87 CM (ref 3.5–6)

## 2024-03-14 PROCEDURE — 93356 MYOCRD STRAIN IMG SPCKL TRCK: CPT | Performed by: INTERNAL MEDICINE

## 2024-03-14 PROCEDURE — 2500000004 HC RX 250 GENERAL PHARMACY W/ HCPCS (ALT 636 FOR OP/ED): Performed by: INTERNAL MEDICINE

## 2024-03-14 PROCEDURE — 93308 TTE F-UP OR LMTD: CPT | Performed by: INTERNAL MEDICINE

## 2024-03-14 PROCEDURE — 93356 MYOCRD STRAIN IMG SPCKL TRCK: CPT

## 2024-03-14 RX ADMIN — HUMAN ALBUMIN MICROSPHERES AND PERFLUTREN 0.5 ML: 10; .22 INJECTION, SOLUTION INTRAVENOUS at 14:16

## 2024-03-19 ENCOUNTER — TELEPHONE (OUTPATIENT)
Dept: CARDIOLOGY | Facility: CLINIC | Age: 74
End: 2024-03-19
Payer: MEDICARE

## 2024-03-19 NOTE — TELEPHONE ENCOUNTER
Left a voicemail - sleep study did show he has sleep apnea.  Dr. Nassar had already put in a referral for him to see the sleep medicine specialist.  He should proceed with that.  Call with any questions.

## 2024-03-19 NOTE — TELEPHONE ENCOUNTER
----- Message from Theresa Nassar MD sent at 3/18/2024  5:38 PM EDT -----  Please refer to Dr Story

## 2024-04-04 ENCOUNTER — DOCUMENTATION (OUTPATIENT)
Dept: CARDIOLOGY | Facility: CLINIC | Age: 74
End: 2024-04-04
Payer: MEDICARE

## 2024-04-12 ENCOUNTER — LAB (OUTPATIENT)
Dept: LAB | Facility: LAB | Age: 74
End: 2024-04-12
Payer: MEDICARE

## 2024-04-12 DIAGNOSIS — N18.4 CHRONIC KIDNEY DISEASE, STAGE 4 (SEVERE) (MULTI): Primary | ICD-10-CM

## 2024-04-12 LAB
25(OH)D3 SERPL-MCNC: 107 NG/ML (ref 30–100)
ALBUMIN SERPL BCP-MCNC: 4.3 G/DL (ref 3.4–5)
ANION GAP SERPL CALC-SCNC: 13 MMOL/L (ref 10–20)
APPEARANCE UR: CLEAR
BILIRUB UR STRIP.AUTO-MCNC: NEGATIVE MG/DL
BUN SERPL-MCNC: 53 MG/DL (ref 6–23)
CALCIUM SERPL-MCNC: 9.1 MG/DL (ref 8.6–10.3)
CHLORIDE SERPL-SCNC: 102 MMOL/L (ref 98–107)
CO2 SERPL-SCNC: 28 MMOL/L (ref 21–32)
COLOR UR: ABNORMAL
CREAT SERPL-MCNC: 3.37 MG/DL (ref 0.5–1.3)
CREAT UR-MCNC: 47.7 MG/DL (ref 20–370)
EGFRCR SERPLBLD CKD-EPI 2021: 18 ML/MIN/1.73M*2
GLUCOSE SERPL-MCNC: 144 MG/DL (ref 74–99)
GLUCOSE UR STRIP.AUTO-MCNC: ABNORMAL MG/DL
KETONES UR STRIP.AUTO-MCNC: NEGATIVE MG/DL
LEUKOCYTE ESTERASE UR QL STRIP.AUTO: NEGATIVE
MICROALBUMIN UR-MCNC: 66.5 MG/L
MICROALBUMIN/CREAT UR: 139.4 UG/MG CREAT
MUCOUS THREADS #/AREA URNS AUTO: NORMAL /LPF
NITRITE UR QL STRIP.AUTO: NEGATIVE
PH UR STRIP.AUTO: 5 [PH]
PHOSPHATE SERPL-MCNC: 3.8 MG/DL (ref 2.5–4.9)
POTASSIUM SERPL-SCNC: 4.1 MMOL/L (ref 3.5–5.3)
PROT UR STRIP.AUTO-MCNC: NEGATIVE MG/DL
PTH-INTACT SERPL-MCNC: 101.1 PG/ML (ref 18.5–88)
RBC # UR STRIP.AUTO: ABNORMAL /UL
RBC #/AREA URNS AUTO: NORMAL /HPF
SODIUM SERPL-SCNC: 139 MMOL/L (ref 136–145)
SP GR UR STRIP.AUTO: 1.01
SQUAMOUS #/AREA URNS AUTO: NORMAL /HPF
UROBILINOGEN UR STRIP.AUTO-MCNC: <2 MG/DL
WBC #/AREA URNS AUTO: NORMAL /HPF

## 2024-04-12 PROCEDURE — 83970 ASSAY OF PARATHORMONE: CPT

## 2024-04-12 PROCEDURE — 82570 ASSAY OF URINE CREATININE: CPT

## 2024-04-12 PROCEDURE — 82043 UR ALBUMIN QUANTITATIVE: CPT

## 2024-04-12 PROCEDURE — 80069 RENAL FUNCTION PANEL: CPT

## 2024-04-12 PROCEDURE — 81001 URINALYSIS AUTO W/SCOPE: CPT

## 2024-04-12 PROCEDURE — 82306 VITAMIN D 25 HYDROXY: CPT

## 2024-04-12 PROCEDURE — 36415 COLL VENOUS BLD VENIPUNCTURE: CPT

## 2024-04-15 DIAGNOSIS — E11.21 TYPE 2 DIABETES WITH NEPHROPATHY (MULTI): ICD-10-CM

## 2024-04-15 RX ORDER — DULAGLUTIDE 4.5 MG/.5ML
4.5 INJECTION, SOLUTION SUBCUTANEOUS
Qty: 12 PEN | Refills: 1 | Status: SHIPPED | OUTPATIENT
Start: 2024-04-21 | End: 2024-10-18

## 2024-04-22 ENCOUNTER — HOSPITAL ENCOUNTER (OUTPATIENT)
Dept: RADIOLOGY | Facility: HOSPITAL | Age: 74
Discharge: HOME | End: 2024-04-22
Payer: MEDICARE

## 2024-04-22 ENCOUNTER — HOSPITAL ENCOUNTER (OUTPATIENT)
Dept: VASCULAR MEDICINE | Facility: HOSPITAL | Age: 74
Discharge: HOME | End: 2024-04-22
Payer: MEDICARE

## 2024-04-22 DIAGNOSIS — N18.4 CHRONIC KIDNEY DISEASE, STAGE 4 (SEVERE) (MULTI): ICD-10-CM

## 2024-04-22 DIAGNOSIS — I10 ESSENTIAL (PRIMARY) HYPERTENSION: ICD-10-CM

## 2024-04-22 DIAGNOSIS — I12.9 HYPERTENSIVE CHRONIC KIDNEY DISEASE WITH STAGE 1 THROUGH STAGE 4 CHRONIC KIDNEY DISEASE, OR UNSPECIFIED CHRONIC KIDNEY DISEASE: ICD-10-CM

## 2024-04-22 PROCEDURE — 93975 VASCULAR STUDY: CPT

## 2024-04-22 PROCEDURE — 93975 VASCULAR STUDY: CPT | Performed by: INTERNAL MEDICINE

## 2024-04-22 PROCEDURE — 76770 US EXAM ABDO BACK WALL COMP: CPT | Mod: 59

## 2024-05-29 DIAGNOSIS — E11.21 TYPE 2 DIABETES WITH NEPHROPATHY (MULTI): Primary | ICD-10-CM

## 2024-05-29 RX ORDER — GLIMEPIRIDE 4 MG/1
4 TABLET ORAL DAILY
Qty: 90 TABLET | Refills: 1 | Status: SHIPPED | OUTPATIENT
Start: 2024-05-29 | End: 2024-11-25

## 2024-05-29 NOTE — TELEPHONE ENCOUNTER
Patient will need glimepiride sent to [facility], [facility] patient will check for available alternatives for the trulicity 4.5mg

## 2024-06-21 ENCOUNTER — LAB (OUTPATIENT)
Dept: LAB | Facility: LAB | Age: 74
End: 2024-06-21
Payer: MEDICARE

## 2024-06-21 DIAGNOSIS — N18.4 CHRONIC KIDNEY DISEASE, STAGE 4 (SEVERE) (MULTI): Primary | ICD-10-CM

## 2024-06-21 LAB
ALBUMIN SERPL BCP-MCNC: 4.2 G/DL (ref 3.4–5)
ANION GAP SERPL CALC-SCNC: 14 MMOL/L (ref 10–20)
APPEARANCE UR: CLEAR
BILIRUB UR STRIP.AUTO-MCNC: NEGATIVE MG/DL
BUN SERPL-MCNC: 48 MG/DL (ref 6–23)
CALCIUM SERPL-MCNC: 8.8 MG/DL (ref 8.6–10.3)
CHLORIDE SERPL-SCNC: 101 MMOL/L (ref 98–107)
CO2 SERPL-SCNC: 26 MMOL/L (ref 21–32)
COLOR UR: COLORLESS
CREAT SERPL-MCNC: 2.47 MG/DL (ref 0.5–1.3)
CREAT UR-MCNC: 40.2 MG/DL (ref 20–370)
EGFRCR SERPLBLD CKD-EPI 2021: 27 ML/MIN/1.73M*2
GLUCOSE SERPL-MCNC: 221 MG/DL (ref 74–99)
GLUCOSE UR STRIP.AUTO-MCNC: ABNORMAL MG/DL
KETONES UR STRIP.AUTO-MCNC: NEGATIVE MG/DL
LEUKOCYTE ESTERASE UR QL STRIP.AUTO: NEGATIVE
MICROALBUMIN UR-MCNC: 48.7 MG/L
MICROALBUMIN/CREAT UR: 121.1 UG/MG CREAT
NITRITE UR QL STRIP.AUTO: NEGATIVE
PH UR STRIP.AUTO: 5.5 [PH]
PHOSPHATE SERPL-MCNC: 3.4 MG/DL (ref 2.5–4.9)
POTASSIUM SERPL-SCNC: 4.1 MMOL/L (ref 3.5–5.3)
PROT UR STRIP.AUTO-MCNC: NEGATIVE MG/DL
RBC # UR STRIP.AUTO: NEGATIVE /UL
SODIUM SERPL-SCNC: 137 MMOL/L (ref 136–145)
SP GR UR STRIP.AUTO: 1.01
UROBILINOGEN UR STRIP.AUTO-MCNC: NORMAL MG/DL

## 2024-06-21 PROCEDURE — 82043 UR ALBUMIN QUANTITATIVE: CPT

## 2024-06-21 PROCEDURE — 81003 URINALYSIS AUTO W/O SCOPE: CPT

## 2024-06-21 PROCEDURE — 82570 ASSAY OF URINE CREATININE: CPT

## 2024-06-21 PROCEDURE — 36415 COLL VENOUS BLD VENIPUNCTURE: CPT

## 2024-06-21 PROCEDURE — 80069 RENAL FUNCTION PANEL: CPT

## 2024-06-26 DIAGNOSIS — E78.5 HYPERLIPIDEMIA, UNSPECIFIED HYPERLIPIDEMIA TYPE: Primary | ICD-10-CM

## 2024-06-26 RX ORDER — OMEGA-3-ACID ETHYL ESTERS 1 G/1
1 CAPSULE, LIQUID FILLED ORAL 2 TIMES DAILY
Qty: 180 CAPSULE | Refills: 0 | Status: SHIPPED | OUTPATIENT
Start: 2024-06-26 | End: 2024-09-24

## 2024-06-26 NOTE — TELEPHONE ENCOUNTER
----- Message from Yazmin Denise RN sent at 6/24/2024  6:28 PM EDT -----  Regarding: Refill  The patient is requesting a refill of his [place] 3 to be sent to [facility]

## 2024-06-26 NOTE — TELEPHONE ENCOUNTER
Patient is following up on the Trulicity 4.5 mg, patient is asking for any advice on medication, and any alternatives.

## 2024-06-26 NOTE — TELEPHONE ENCOUNTER
As an alternative for Trulicity:  To commence Ozempic as follows:  0.25mg SQ once weekly for the first four weeks, then   0.5mg SQ weekly for four weeks, then  1mg SQ weekly for four weeks, and then  2mg SQ weekly

## 2024-07-05 DIAGNOSIS — E78.5 HYPERLIPIDEMIA, UNSPECIFIED HYPERLIPIDEMIA TYPE: ICD-10-CM

## 2024-07-08 RX ORDER — EZETIMIBE 10 MG/1
10 TABLET ORAL DAILY
Qty: 90 TABLET | Refills: 3 | Status: SHIPPED | OUTPATIENT
Start: 2024-07-08 | End: 2025-07-08

## 2024-07-08 RX ORDER — OMEGA-3-ACID ETHYL ESTERS 1 G/1
1 CAPSULE, LIQUID FILLED ORAL 2 TIMES DAILY
Qty: 180 CAPSULE | Refills: 3 | Status: SHIPPED | OUTPATIENT
Start: 2024-07-08 | End: 2025-07-08

## 2024-07-11 ENCOUNTER — TELEPHONE (OUTPATIENT)
Dept: CARDIOLOGY | Facility: CLINIC | Age: 74
End: 2024-07-11
Payer: MEDICARE

## 2024-07-11 NOTE — TELEPHONE ENCOUNTER
Called to let him know Lovaza was not approved by his insurance.  Can take OTC Omega 3 in it's place.  He verbalized understanding.

## 2024-07-11 NOTE — TELEPHONE ENCOUNTER
----- Message from Debra VELARDE sent at 7/8/2024 11:04 AM EDT -----  Regarding: Denial of a medication.  Waldemar' wife called  saying on e of Waldeamr' prescriptions was denied. She wanted to speak with you about it. [phone]. Thank You.

## 2024-08-06 ENCOUNTER — APPOINTMENT (OUTPATIENT)
Dept: ENDOCRINOLOGY | Facility: CLINIC | Age: 74
End: 2024-08-06
Payer: MEDICARE

## 2024-08-06 VITALS
BODY MASS INDEX: 34.79 KG/M2 | WEIGHT: 243 LBS | DIASTOLIC BLOOD PRESSURE: 66 MMHG | SYSTOLIC BLOOD PRESSURE: 130 MMHG | HEIGHT: 70 IN | HEART RATE: 61 BPM

## 2024-08-06 DIAGNOSIS — E11.9 CONTROLLED TYPE 2 DIABETES MELLITUS WITHOUT COMPLICATION, WITH LONG-TERM CURRENT USE OF INSULIN (MULTI): ICD-10-CM

## 2024-08-06 DIAGNOSIS — Z79.4 CONTROLLED TYPE 2 DIABETES MELLITUS WITHOUT COMPLICATION, WITH LONG-TERM CURRENT USE OF INSULIN (MULTI): ICD-10-CM

## 2024-08-06 PROCEDURE — 3008F BODY MASS INDEX DOCD: CPT | Performed by: INTERNAL MEDICINE

## 2024-08-06 PROCEDURE — 3078F DIAST BP <80 MM HG: CPT | Performed by: INTERNAL MEDICINE

## 2024-08-06 PROCEDURE — 3060F POS MICROALBUMINURIA REV: CPT | Performed by: INTERNAL MEDICINE

## 2024-08-06 PROCEDURE — 1159F MED LIST DOCD IN RCRD: CPT | Performed by: INTERNAL MEDICINE

## 2024-08-06 PROCEDURE — G2211 COMPLEX E/M VISIT ADD ON: HCPCS | Performed by: INTERNAL MEDICINE

## 2024-08-06 PROCEDURE — 1160F RVW MEDS BY RX/DR IN RCRD: CPT | Performed by: INTERNAL MEDICINE

## 2024-08-06 PROCEDURE — 99214 OFFICE O/P EST MOD 30 MIN: CPT | Performed by: INTERNAL MEDICINE

## 2024-08-06 PROCEDURE — 4010F ACE/ARB THERAPY RXD/TAKEN: CPT | Performed by: INTERNAL MEDICINE

## 2024-08-06 PROCEDURE — 3075F SYST BP GE 130 - 139MM HG: CPT | Performed by: INTERNAL MEDICINE

## 2024-08-06 RX ORDER — INSULIN DEGLUDEC 100 U/ML
18 INJECTION, SOLUTION SUBCUTANEOUS NIGHTLY
Qty: 5 EACH | Refills: 5 | Status: SHIPPED | OUTPATIENT
Start: 2024-08-06 | End: 2025-02-02

## 2024-08-06 RX ORDER — DAPAGLIFLOZIN 10 MG/1
10 TABLET, FILM COATED ORAL DAILY
Qty: 90 TABLET | Refills: 1 | Status: SHIPPED | OUTPATIENT
Start: 2024-08-06 | End: 2025-02-02

## 2024-08-06 RX ORDER — GLIMEPIRIDE 2 MG/1
2 TABLET ORAL
Qty: 90 TABLET | Refills: 1 | Status: SHIPPED | OUTPATIENT
Start: 2024-08-06 | End: 2025-02-02

## 2024-08-06 RX ORDER — SEMAGLUTIDE 0.68 MG/ML
0.25 INJECTION, SOLUTION SUBCUTANEOUS
Qty: 3 ML | Refills: 0 | Status: CANCELLED | OUTPATIENT
Start: 2024-08-06 | End: 2024-09-05

## 2024-08-06 RX ORDER — GLIMEPIRIDE 4 MG/1
4 TABLET ORAL DAILY
Qty: 90 TABLET | Refills: 1 | Status: CANCELLED | OUTPATIENT
Start: 2024-08-06 | End: 2025-02-02

## 2024-08-06 RX ORDER — SEMAGLUTIDE 1.34 MG/ML
1 INJECTION, SOLUTION SUBCUTANEOUS
Qty: 3 ML | Refills: 0 | Status: SHIPPED | OUTPATIENT
Start: 2024-08-06 | End: 2024-09-05

## 2024-08-06 RX ORDER — TORSEMIDE 10 MG/1
10 TABLET ORAL DAILY
COMMUNITY
Start: 2024-07-02

## 2024-08-06 ASSESSMENT — ENCOUNTER SYMPTOMS
ARTHRALGIAS: 1
LIGHT-HEADEDNESS: 1
DIZZINESS: 1
BACK PAIN: 1
APNEA: 1

## 2024-08-06 NOTE — PROGRESS NOTES
"Subjective   Waldemar Trujillo is a 73 y.o. male who presents for follow-up of Type 2 diabetes mellitus. The initial diagnosis of diabetes was made in 2007 .     He has been fighting depression due to multiple deaths of family members - brother, sister, uncle.    He sees chiropracter once per weekly.      Known complications due to diabetes included CAD s/p CABG, chronic kidney disease, and obesity    Cardiovascular risk factors include advanced age (older than 55 for men, 65 for women), diabetes mellitus, male gender, and obesity (BMI >= 30 kg/m2). The patient is on an ACE inhibitor or angiotensin II receptor blocker.  The patient has not been previously hospitalized due to diabetic ketoacidosis.     Current symptoms/problems include none. His clinical course has been stable.       The patient is currently checking the blood glucose at least one time per day.    Patient is using: glucometer                Hypoglycemia frequency: as documented above; he has reduced his dose of basal insulin and sulfonylurea    Hypoglycemia awareness: Yes     Current Medication Regimen  Tresiba 18 units SQ daily   Glimepiride 2mg once daily  Ozempic 0.5mg SQ once weekly   Farxiga 10mg once daily      MEALS - does not eat if not hungry   Beverages - stevia based      Exercise regimen - walking dogs on [place] property   His maximum weight was 365 lbs       Review of Systems   HENT:  Positive for hearing loss and tinnitus.    Respiratory:  Positive for apnea.    Cardiovascular:  Negative for leg swelling.   Musculoskeletal:  Positive for arthralgias and back pain.   Neurological:  Positive for dizziness and light-headedness.   Psychiatric/Behavioral:          Depression    All other systems reviewed and are negative.      Objective   /66 (BP Location: Left arm, Patient Position: Sitting, BP Cuff Size: Adult)   Pulse 61   Ht 1.778 m (5' 10\")   Wt 110 kg (243 lb)   BMI 34.87 kg/m²   Physical Exam  Vitals and nursing note reviewed. "   Constitutional:       General: He is not in acute distress.     Appearance: Normal appearance. He is obese.   HENT:      Head: Normocephalic and atraumatic.      Nose: Nose normal.      Mouth/Throat:      Mouth: Mucous membranes are moist.   Eyes:      Extraocular Movements: Extraocular movements intact.   Cardiovascular:      Rate and Rhythm: Normal rate and regular rhythm.   Pulmonary:      Effort: Pulmonary effort is normal.      Breath sounds: Normal breath sounds.   Musculoskeletal:         General: Normal range of motion.   Skin:     General: Skin is warm.   Neurological:      Mental Status: He is alert and oriented to person, place, and time.   Psychiatric:         Mood and Affect: Mood normal.       Lab Review  Glucose (mg/dL)   Date Value   06/21/2024 221 (H)   04/12/2024 144 (H)   10/09/2023 80     POC HEMOGLOBIN A1c (%)   Date Value   01/31/2024 7.9 (A)     Hemoglobin A1C (%)   Date Value   10/09/2023 7.2 (H)   02/24/2023 7.1 (A)   08/24/2022 6.7 (A)   08/24/2022 6.7 (A)     Bicarbonate (mmol/L)   Date Value   06/21/2024 26   04/12/2024 28   10/09/2023 23     Urea Nitrogen (mg/dL)   Date Value   06/21/2024 48 (H)   04/12/2024 53 (H)   10/09/2023 31 (H)     Creatinine (mg/dL)   Date Value   06/21/2024 2.47 (H)   04/12/2024 3.37 (H)   10/09/2023 1.97 (H)     Lab Results   Component Value Date    CHOL 111 10/09/2023    CHOL 103 08/24/2022    CHOL 98 01/19/2022     Lab Results   Component Value Date    HDL 40.7 10/09/2023    HDL 31.4 (A) 08/24/2022    HDL 26.2 (A) 01/19/2022     Lab Results   Component Value Date    LDLCALC 54 (L) 10/09/2023     Lab Results   Component Value Date    TRIG 80 10/09/2023    TRIG 105 08/24/2022    TRIG 98 01/19/2022       Health Maintenance:   Foot Exam: January 24, 2023  Eye Exam: 2023  Urine Albumin:  October 9, 2023    Assessment/Plan   73-year-old male presents for follow-up for type 2 diabetes.  He is noted to be on a statin. He is noted to be on an ARB. His blood pressure  is at goal today.     Diabetes type 2, controlled (Multi)  To incrase Ozempic as follows:  1mg SQ weekly for four weeks, and then  2mg SQ weekly   To continue Glimepiride 2mg once daily  To continue Tresiba 18 units SQ at bedtime  To decrease Tresiba by 2 units if glucose values are going less than 70mg/dL with the increased dose of Ozempic   To continue Farxiga 10mg once daily   To obtain blood tests as ordered in February   Follow up in 6 months with glucose log

## 2024-08-06 NOTE — PATIENT INSTRUCTIONS
Thank you for choosing [facility]  for your health care needs.  If you have any questions, concerns or medical needs, please feel free to contact our office at [phone].    Please ensure you complete your blood work one week before the next scheduled appointment.    To incrase Ozempic as follows:  1mg SQ weekly for four weeks, and then  2mg SQ weekly   To continue Glimepiride 2mg once daily  To continue Tresiba 18 units SQ at bedtime  To decrease Tresiba by 2 units if glucose values are going less than 70mg/dL with the increased dose of Ozempic   To continue Farxiga 10mg once daily   To obtain blood tests as ordered in February   Follow up in 6 months with glucose log

## 2024-08-06 NOTE — ASSESSMENT & PLAN NOTE
To incrase Ozempic as follows:  1mg SQ weekly for four weeks, and then  2mg SQ weekly   To continue Glimepiride 2mg once daily  To continue Tresiba 18 units SQ at bedtime  To decrease Tresiba by 2 units if glucose values are going less than 70mg/dL with the increased dose of Ozempic   To continue Farxiga 10mg once daily   To obtain blood tests as ordered in February   Follow up in 6 months with glucose log

## 2024-08-08 PROBLEM — E11.65 TYPE 2 DIABETES MELLITUS WITH HYPERGLYCEMIA (MULTI): Status: ACTIVE | Noted: 2024-08-08

## 2024-08-08 PROBLEM — N18.32 STAGE 3B CHRONIC KIDNEY DISEASE (MULTI): Status: ACTIVE | Noted: 2024-08-08

## 2024-08-08 PROBLEM — I12.9 HYPERTENSIVE CHRONIC KIDNEY DISEASE WITH STAGE 1 THROUGH STAGE 4 CHRONIC KIDNEY DISEASE, OR UNSPECIFIED CHRONIC KIDNEY DISEASE: Status: ACTIVE | Noted: 2024-08-08

## 2024-08-08 PROBLEM — K21.9 GERD WITHOUT ESOPHAGITIS: Status: ACTIVE | Noted: 2024-08-08

## 2024-08-08 PROBLEM — I50.32 CHRONIC DIASTOLIC HEART FAILURE (MULTI): Status: ACTIVE | Noted: 2024-08-08

## 2024-08-08 PROBLEM — E83.52 HYPERCALCEMIA: Status: ACTIVE | Noted: 2024-08-08

## 2024-08-08 PROBLEM — E78.2 MIXED HYPERLIPIDEMIA: Status: ACTIVE | Noted: 2024-08-08

## 2024-08-08 PROBLEM — E11.21 DIABETIC NEPHROPATHY (MULTI): Status: ACTIVE | Noted: 2024-08-08

## 2024-08-08 PROBLEM — L98.9 SKIN LESION: Status: ACTIVE | Noted: 2024-08-08

## 2024-08-08 PROBLEM — E87.5 HYPERKALEMIA: Status: ACTIVE | Noted: 2024-08-08

## 2024-08-08 RX ORDER — ICOSAPENT ETHYL 1 G/1
CAPSULE ORAL
COMMUNITY
End: 2024-08-15 | Stop reason: WASHOUT

## 2024-08-08 RX ORDER — WARFARIN 7.5 MG/1
7.5 TABLET ORAL
COMMUNITY
End: 2024-08-15 | Stop reason: WASHOUT

## 2024-08-08 RX ORDER — LIRAGLUTIDE 6 MG/ML
0.6 INJECTION SUBCUTANEOUS EVERY 24 HOURS
COMMUNITY

## 2024-08-08 RX ORDER — SODIUM BICARBONATE 650 MG/1
650 TABLET ORAL
COMMUNITY
Start: 2024-02-29

## 2024-08-08 RX ORDER — SOTALOL HYDROCHLORIDE 80 MG/1
80 TABLET ORAL EVERY 12 HOURS
COMMUNITY
End: 2024-08-15 | Stop reason: ALTCHOICE

## 2024-08-08 RX ORDER — FINERENONE 20 MG/1
20 TABLET, FILM COATED ORAL
COMMUNITY
Start: 2024-07-02

## 2024-08-08 RX ORDER — TAMSULOSIN HYDROCHLORIDE 0.4 MG/1
0.4 CAPSULE ORAL DAILY
COMMUNITY
Start: 2024-06-23

## 2024-08-08 RX ORDER — SPIRONOLACTONE 25 MG/1
25 TABLET ORAL DAILY
COMMUNITY
End: 2024-08-15 | Stop reason: WASHOUT

## 2024-08-12 ASSESSMENT — ENCOUNTER SYMPTOMS
FALLS: 0
DECREASED APPETITE: 0
GASTROINTESTINAL NEGATIVE: 1
DYSPNEA ON EXERTION: 0
BLURRED VISION: 0
COUGH: 0
IRREGULAR HEARTBEAT: 0
MEMORY LOSS: 0
DEPRESSION: 0
FOCAL WEAKNESS: 0
ORTHOPNEA: 0
CONSTITUTIONAL NEGATIVE: 1
SHORTNESS OF BREATH: 0
SYNCOPE: 0
RESPIRATORY NEGATIVE: 1
PALPITATIONS: 0

## 2024-08-12 NOTE — PROGRESS NOTES
Chief Complaint/Reason for Visit:   Patient is coming in today as a 6 month Cardiovascular follow up.     History Of Present Illness:    Mr. Trujillo is coming today as a 6-month cardiovascular follow-up.  We have followed this patient previously for hypertensive urgency, chronic diastolic heart failure, coronary artery disease (CABG 2000), hypertension, dyslipidemia, and atrial fibrillation.  His other medical history includes diabetes and CKD.    Patient comes in today generally feeling well.  He has had no recent hospitalizations or emergency room visits.  He denies any chest pain, palpitations, orthopnea, or or edema.  He will get some chest tightness when he climbs a steep hill, this has been a chronic complaint.  Patient reports taking his medication as prescribed.  He was to have followed up with sleep medicine but never made an appointment.     Past Medical History:  He has a past medical history of Activated protein C resistance (Multi), Acute diastolic (congestive) heart failure (Multi) (10/29/2018), Atherosclerosis of native coronary artery of native heart without angina pectoris (04/05/2023), Benign essential hypertension (04/05/2023), Benign prostatic hyperplasia with lower urinary tract symptoms (11/15/2019), Chronic diastolic heart failure, NYHA class 2 (Multi) (04/05/2023), Chronic kidney disease (CKD) stage G3b/A1, moderately decreased glomerular filtration rate (GFR) between 30-44 mL/min/1.73 square meter and albuminuria creatinine ratio less than 30 mg/g (CMS* (Multi) (04/05/2023), Diabetes type 2, controlled (Multi) (04/05/2023), History of depression (04/05/2023), Hyperlipemia (04/05/2023), Morbid obesity (Multi) (04/05/2023), Obesity (BMI 30-39.9) (04/05/2023), KENNEY treated with BiPAP (04/05/2023), Paroxysmal atrial fibrillation (Multi) (04/05/2023), Personal history of Methicillin resistant Staphylococcus aureus infection, Personal history of other diseases of male genital organs (11/15/2019),  Personal history of other diseases of the musculoskeletal system and connective tissue (11/06/2019), Personal history of other diseases of the musculoskeletal system and connective tissue, Personal history of other diseases of the musculoskeletal system and connective tissue, Personal history of other diseases of the nervous system and sense organs, Personal history of other diseases of the nervous system and sense organs, Personal history of other diseases of the respiratory system (11/15/2019), Personal history of other endocrine, nutritional and metabolic disease (11/15/2019), Personal history of other endocrine, nutritional and metabolic disease (11/15/2019), Personal history of urinary calculi, Right ventricular systolic dysfunction (04/05/2023), Type 2 diabetes mellitus without complications (Multi) (12/02/2019), Type 2 diabetes with nephropathy (Multi) (04/05/2023), and Venous stasis of lower extremity (04/05/2023).    Past Surgical History:  He has a past surgical history that includes Total hip arthroplasty (10/06/2016); Coronary artery bypass graft (10/06/2016); Cataract extraction (08/28/2017); Other surgical history (10/29/2018); Other surgical history (11/09/2019); Other surgical history (11/09/2019); Other surgical history (11/09/2019); and [place] tooth extraction.      Social History:  He reports that he has quit smoking. His smoking use included cigarettes. He does not have any smokeless tobacco history on file. He reports that he does not currently use alcohol. He reports current drug use. Frequency: 1.00 time per week. Drug: Marijuana.    Family History:  Family History   Problem Relation Name Age of Onset    Coronary artery disease Mother      Diabetes Mother      Coronary artery disease Father      Diabetes Father          Allergies:  Ace inhibitors and Atorvastatin    Medications:  Current Outpatient Medications   Medication Instructions    alpha lipoic acid 800 mg, oral, 2 times daily     "amLODIPine (NORVASC) 5 mg, oral, Daily    apixaban (ELIQUIS) 5 mg, oral, 2 times daily    BD Ultra-Fine Mini Pen Needle 31 gauge x 3/16\" needle 1 each, subcutaneous, Nightly    coenzyme Q-10 200 mg capsule oral    ezetimibe (ZETIA) 10 mg, oral, Daily    famotidine (Pepcid) 20 mg tablet 1 tablet, oral, Every 12 hours    Farxiga 10 mg, oral, Daily    ferrous gluconate 236 mg (27 mg iron) tablet 1 tablet, oral, 2 times weekly    glimepiride (AMARYL) 2 mg, oral, Daily before breakfast    icosapent ethyL (Vascepa) 1 gram capsule TAKE TWO CAPSULES BY MOUTH TWO TIMES A DAY Oral for 90    insulin degludec (TRESIBA FLEXTOUCH) 18 Units, subcutaneous, Nightly    Kerendia 20 mg, oral    liraglutide (VICTOZA 2-LUPE) 0.6 mg, subcutaneous, Every 24 hours    losartan (COZAAR) 50 mg, oral, Daily    miscellaneous medical supply (Blood Pressure Cuff) misc Daily home blood pressure monitoring    nitroglycerin (NITROSTAT) 0.4 mg, sublingual, Every 5 min PRN    omega-3 acid ethyl esters (LOVAZA) 1 g, oral, 2 times daily    Ozempic 1 mg, subcutaneous, Every 7 days    rosuvastatin (CRESTOR) 40 mg, oral, Daily    sodium bicarbonate 650 mg, oral    tamsulosin (FLOMAX) 0.4 mg, oral, Daily    torsemide (DEMADEX) 10 mg, oral, Daily       Review of Systems:  Review of Systems   Constitutional: Negative. Negative for decreased appetite and malaise/fatigue.   HENT: Negative.     Eyes:  Negative for blurred vision and visual disturbance.   Cardiovascular:  Negative for chest pain, dyspnea on exertion, irregular heartbeat, leg swelling, orthopnea, palpitations and syncope.        Chest tightness in chest with hills, chronic issues   Respiratory: Negative.  Negative for cough and shortness of breath.    Musculoskeletal:  Negative for arthritis and falls.   Gastrointestinal: Negative.    Neurological:  Negative for focal weakness and light-headedness (Occasional lightheadedness).   Psychiatric/Behavioral:  Negative for depression and memory loss.     " "    Vitals  Visit Vitals  /82 (BP Location: Left arm, Patient Position: Sitting, BP Cuff Size: Adult)   Pulse 61   Ht 1.778 m (5' 10\")   Wt 109 kg (240 lb)   SpO2 98%   BMI 34.44 kg/m²   Smoking Status Former   BSA 2.32 m²        Physical Exam:  Constitutional:       Appearance: Normal appearance.   Neck:      Vascular: No carotid bruit or JVD.   Cardiovascular:      Rate and Rhythm: Normal rate and regular rhythm.      Heart sounds: Normal heart sounds, S1 normal and S2 normal. No murmur heard.  Pulmonary:      Effort: Pulmonary effort is normal.      Breath sounds: Normal breath sounds.   Abdominal:      General: Abdomen is flat. Bowel sounds are normal.      Palpations: Abdomen is soft.   Musculoskeletal:      Right lower leg: Trace edema     Left lower leg:  Trace edema  Skin:     General: Skin is warm.   Neurological:      Mental Status: He is alert. Mental status is at baseline.   Psychiatric:         Mood and Affect: Mood normal.         Behavior: Behavior normal.      Last Labs:  CBC -  Lab Results   Component Value Date    WBC 5.0 11/13/2023    HGB 14.7 11/13/2023    HCT 44.4 11/13/2023    MCV 94 11/13/2023     11/13/2023     Lab Results   Component Value Date    GLUCOSE 221 (H) 06/21/2024    CALCIUM 8.8 06/21/2024     06/21/2024    K 4.1 06/21/2024    CO2 26 06/21/2024     06/21/2024    BUN 48 (H) 06/21/2024    CREATININE 2.47 (H) 06/21/2024      CMP -  Lab Results   Component Value Date    CALCIUM 8.8 06/21/2024    PHOS 3.4 06/21/2024    PROT 7.1 08/24/2022    ALBUMIN 4.2 06/21/2024    AST 18 11/13/2023    ALT 15 11/13/2023    ALKPHOS 70 08/24/2022    BILITOT 0.4 08/24/2022       LIPID PANEL -   Lab Results   Component Value Date    CHOL 111 10/09/2023    TRIG 80 10/09/2023    HDL 40.7 10/09/2023    CHHDL 2.7 10/09/2023    LDLF 51 08/24/2022    VLDL 16 10/09/2023    NHDL 70 10/09/2023       Lab Results   Component Value Date    BNP 86 08/24/2022    HGBA1C 7.9 (A) 01/31/2024 "       Last Cardiology Tests:    Echo:6-12-23  CONCLUSIONS:  1. Left ventricular systolic function is normal with a 60% estimated ejection fraction.  2. There is reduced right ventricular systolic function.       Lab review: I have personally reviewed the laboratory result(s) from June, 2024    Assessment/Plan:    ASHD: Patient had coronary artery bypass grafting done in 2000.  He currently is on amlodipine, Zetia, Farxiga, fish oil, and a statin.  Patient appears to be stable.  He will continue on medical therapy.    Persistent/chronic atrial fibrillation: Patient is rate controlled atrial fibrillation.  Patient has an elevated [id number] score requiring anticoagulation.  Patient is not having any abnormal bleeding or bruising and should continue on anticoagulation.    Chronic diastolic heart failure: Patient appears to be well compensated on exam.  He is nephrologist recently changed him from furosemide to torsemide.  He has lab work due later this summer.    Hypertension: Blood pressure today is well-controlled.  Patient will continue on his current blood pressure lowering regimen which includes amlodipine, losartan, and torsemide.  Patient should be on a heart healthy low-sodium diet.    KENNEY: Patient had previously been referred to sleep medicine for sleep apnea.  Patient reported that he was sleeping well so he did not think he needed to keep the appointment.  Patient will be referred back to sleep medicine.  I discussed the importance of treating sleep apnea.    Patient will follow-up with Dr. Nassar in 6 months.  I asked him to verify his medication list with his home prescription bottles as there were multiple discrepancies.  He will let us know what we need to adjust.  Patient instructed to call with any cardiovascular complaints. All questions were answered.       Dragon dictation was utilized to create this document. Quite often unanticipated grammatical, syntax,  and other interpretive errors are  inadvertently transcribed by the computer software.  Please disregard these errors.  Please excuse any errors that have escaped final proofreading.              ORLIN Muñoz-CNP

## 2024-08-15 ENCOUNTER — APPOINTMENT (OUTPATIENT)
Dept: CARDIOLOGY | Facility: CLINIC | Age: 74
End: 2024-08-15
Payer: MEDICARE

## 2024-08-15 VITALS
HEART RATE: 61 BPM | HEIGHT: 70 IN | OXYGEN SATURATION: 98 % | WEIGHT: 240 LBS | SYSTOLIC BLOOD PRESSURE: 126 MMHG | DIASTOLIC BLOOD PRESSURE: 82 MMHG | BODY MASS INDEX: 34.36 KG/M2

## 2024-08-15 DIAGNOSIS — G47.33 OSA (OBSTRUCTIVE SLEEP APNEA): ICD-10-CM

## 2024-08-15 DIAGNOSIS — I10 BENIGN ESSENTIAL HYPERTENSION: ICD-10-CM

## 2024-08-15 DIAGNOSIS — E78.2 MIXED HYPERLIPIDEMIA: ICD-10-CM

## 2024-08-15 DIAGNOSIS — I48.11 LONGSTANDING PERSISTENT ATRIAL FIBRILLATION (MULTI): ICD-10-CM

## 2024-08-15 DIAGNOSIS — I25.10 ATHEROSCLEROSIS OF NATIVE CORONARY ARTERY OF NATIVE HEART WITHOUT ANGINA PECTORIS: Primary | ICD-10-CM

## 2024-08-15 DIAGNOSIS — I50.32 CHRONIC DIASTOLIC HEART FAILURE (MULTI): ICD-10-CM

## 2024-08-15 PROCEDURE — 3074F SYST BP LT 130 MM HG: CPT | Performed by: NURSE PRACTITIONER

## 2024-08-15 PROCEDURE — 1160F RVW MEDS BY RX/DR IN RCRD: CPT | Performed by: NURSE PRACTITIONER

## 2024-08-15 PROCEDURE — 99213 OFFICE O/P EST LOW 20 MIN: CPT | Performed by: NURSE PRACTITIONER

## 2024-08-15 PROCEDURE — 3060F POS MICROALBUMINURIA REV: CPT | Performed by: NURSE PRACTITIONER

## 2024-08-15 PROCEDURE — 1159F MED LIST DOCD IN RCRD: CPT | Performed by: NURSE PRACTITIONER

## 2024-08-15 PROCEDURE — 3008F BODY MASS INDEX DOCD: CPT | Performed by: NURSE PRACTITIONER

## 2024-08-15 PROCEDURE — 4010F ACE/ARB THERAPY RXD/TAKEN: CPT | Performed by: NURSE PRACTITIONER

## 2024-08-15 PROCEDURE — 3079F DIAST BP 80-89 MM HG: CPT | Performed by: NURSE PRACTITIONER

## 2024-08-15 ASSESSMENT — ENCOUNTER SYMPTOMS: LIGHT-HEADEDNESS: 0

## 2024-08-15 NOTE — PATIENT INSTRUCTIONS
Continue on current meds  Heart healthy, low sodium diet  Mediterranean diet is recommended  Referral to sleep medicine  Follow up with Dr Nassar in March

## 2024-09-09 DIAGNOSIS — Z79.4 CONTROLLED TYPE 2 DIABETES MELLITUS WITHOUT COMPLICATION, WITH LONG-TERM CURRENT USE OF INSULIN (MULTI): ICD-10-CM

## 2024-09-09 DIAGNOSIS — E11.9 CONTROLLED TYPE 2 DIABETES MELLITUS WITHOUT COMPLICATION, WITH LONG-TERM CURRENT USE OF INSULIN (MULTI): ICD-10-CM

## 2024-09-10 RX ORDER — SEMAGLUTIDE 1.34 MG/ML
1 INJECTION, SOLUTION SUBCUTANEOUS
Qty: 3 ML | Refills: 0 | OUTPATIENT
Start: 2024-09-10 | End: 2024-10-10

## 2024-09-10 RX ORDER — SEMAGLUTIDE 2.68 MG/ML
2 INJECTION, SOLUTION SUBCUTANEOUS WEEKLY
Qty: 3 ML | Refills: 5 | Status: SHIPPED | OUTPATIENT
Start: 2024-09-10 | End: 2025-03-09

## 2024-09-10 NOTE — TELEPHONE ENCOUNTER
Patient's wife called to follow up on refill request from [facility]. She asked will the dose be increased as discussed at appointment. Please advise.

## 2024-09-12 ENCOUNTER — LAB (OUTPATIENT)
Dept: LAB | Facility: LAB | Age: 74
End: 2024-09-12
Payer: MEDICARE

## 2024-09-12 DIAGNOSIS — E11.21 TYPE 2 DIABETES WITH NEPHROPATHY (MULTI): ICD-10-CM

## 2024-09-12 LAB
ALBUMIN SERPL BCP-MCNC: 4.1 G/DL (ref 3.4–5)
ALP SERPL-CCNC: 80 U/L (ref 33–136)
ALT SERPL W P-5'-P-CCNC: 32 U/L (ref 10–52)
ANION GAP SERPL CALC-SCNC: 10 MMOL/L (ref 10–20)
AST SERPL W P-5'-P-CCNC: 26 U/L (ref 9–39)
BILIRUB SERPL-MCNC: 0.5 MG/DL (ref 0–1.2)
BUN SERPL-MCNC: 40 MG/DL (ref 6–23)
CALCIUM SERPL-MCNC: 8.8 MG/DL (ref 8.6–10.3)
CHLORIDE SERPL-SCNC: 104 MMOL/L (ref 98–107)
CHOLEST SERPL-MCNC: 94 MG/DL (ref 0–199)
CHOLESTEROL/HDL RATIO: 2.8
CO2 SERPL-SCNC: 30 MMOL/L (ref 21–32)
CREAT SERPL-MCNC: 2.4 MG/DL (ref 0.5–1.3)
CREAT UR-MCNC: 24 MG/DL (ref 20–370)
EGFRCR SERPLBLD CKD-EPI 2021: 28 ML/MIN/1.73M*2
EST. AVERAGE GLUCOSE BLD GHB EST-MCNC: 157 MG/DL
GLUCOSE SERPL-MCNC: 84 MG/DL (ref 74–99)
HBA1C MFR BLD: 7.1 %
HDLC SERPL-MCNC: 33.5 MG/DL
LDLC SERPL CALC-MCNC: 46 MG/DL
MICROALBUMIN UR-MCNC: 36.6 MG/L
MICROALBUMIN/CREAT UR: 152.5 UG/MG CREAT
NON HDL CHOLESTEROL: 61 MG/DL (ref 0–149)
POTASSIUM SERPL-SCNC: 4.3 MMOL/L (ref 3.5–5.3)
PROT SERPL-MCNC: 6.8 G/DL (ref 6.4–8.2)
SODIUM SERPL-SCNC: 140 MMOL/L (ref 136–145)
TRIGL SERPL-MCNC: 74 MG/DL (ref 0–149)
VLDL: 15 MG/DL (ref 0–40)

## 2024-09-12 PROCEDURE — 80053 COMPREHEN METABOLIC PANEL: CPT

## 2024-09-12 PROCEDURE — 36415 COLL VENOUS BLD VENIPUNCTURE: CPT

## 2024-09-12 PROCEDURE — 82043 UR ALBUMIN QUANTITATIVE: CPT

## 2024-09-12 PROCEDURE — 80061 LIPID PANEL: CPT

## 2024-09-12 PROCEDURE — 83036 HEMOGLOBIN GLYCOSYLATED A1C: CPT

## 2024-09-12 PROCEDURE — 82570 ASSAY OF URINE CREATININE: CPT

## 2024-10-03 ENCOUNTER — LAB (OUTPATIENT)
Dept: LAB | Facility: LAB | Age: 74
End: 2024-10-03
Payer: MEDICARE

## 2024-10-03 DIAGNOSIS — N18.32 CHRONIC KIDNEY DISEASE, STAGE 3B (MULTI): Primary | ICD-10-CM

## 2024-10-03 LAB
ALBUMIN SERPL BCP-MCNC: 4.4 G/DL (ref 3.4–5)
ANION GAP SERPL CALC-SCNC: 13 MMOL/L (ref 10–20)
APPEARANCE UR: CLEAR
BILIRUB UR STRIP.AUTO-MCNC: NEGATIVE MG/DL
BUN SERPL-MCNC: 35 MG/DL (ref 6–23)
CALCIUM SERPL-MCNC: 9.6 MG/DL (ref 8.6–10.3)
CHLORIDE SERPL-SCNC: 103 MMOL/L (ref 98–107)
CO2 SERPL-SCNC: 27 MMOL/L (ref 21–32)
COLOR UR: COLORLESS
CREAT SERPL-MCNC: 2.37 MG/DL (ref 0.5–1.3)
EGFRCR SERPLBLD CKD-EPI 2021: 28 ML/MIN/1.73M*2
GLUCOSE SERPL-MCNC: 75 MG/DL (ref 74–99)
GLUCOSE UR STRIP.AUTO-MCNC: ABNORMAL MG/DL
KETONES UR STRIP.AUTO-MCNC: NEGATIVE MG/DL
LEUKOCYTE ESTERASE UR QL STRIP.AUTO: NEGATIVE
MAGNESIUM SERPL-MCNC: 2.03 MG/DL (ref 1.6–2.4)
NITRITE UR QL STRIP.AUTO: NEGATIVE
PH UR STRIP.AUTO: 5 [PH]
PHOSPHATE SERPL-MCNC: 4.4 MG/DL (ref 2.5–4.9)
POTASSIUM SERPL-SCNC: 4.2 MMOL/L (ref 3.5–5.3)
PROT UR STRIP.AUTO-MCNC: NEGATIVE MG/DL
RBC # UR STRIP.AUTO: NEGATIVE /UL
SODIUM SERPL-SCNC: 139 MMOL/L (ref 136–145)
SP GR UR STRIP.AUTO: 1.01
UROBILINOGEN UR STRIP.AUTO-MCNC: NORMAL MG/DL

## 2024-10-03 PROCEDURE — 81003 URINALYSIS AUTO W/O SCOPE: CPT

## 2024-10-03 PROCEDURE — 83735 ASSAY OF MAGNESIUM: CPT

## 2024-10-03 PROCEDURE — 80069 RENAL FUNCTION PANEL: CPT

## 2024-10-03 PROCEDURE — 36415 COLL VENOUS BLD VENIPUNCTURE: CPT

## 2024-10-09 ENCOUNTER — APPOINTMENT (OUTPATIENT)
Dept: PRIMARY CARE | Facility: CLINIC | Age: 74
End: 2024-10-09
Payer: MEDICARE

## 2024-10-09 VITALS
HEART RATE: 72 BPM | WEIGHT: 230 LBS | DIASTOLIC BLOOD PRESSURE: 79 MMHG | BODY MASS INDEX: 32.93 KG/M2 | OXYGEN SATURATION: 98 % | SYSTOLIC BLOOD PRESSURE: 121 MMHG | HEIGHT: 70 IN | RESPIRATION RATE: 16 BRPM | TEMPERATURE: 97.1 F

## 2024-10-09 DIAGNOSIS — D68.51 ACTIVATED PROTEIN C RESISTANCE (MULTI): ICD-10-CM

## 2024-10-09 DIAGNOSIS — Z87.891 PERSONAL HISTORY OF TOBACCO USE, PRESENTING HAZARDS TO HEALTH: ICD-10-CM

## 2024-10-09 DIAGNOSIS — Z00.00 ROUTINE GENERAL MEDICAL EXAMINATION AT HEALTH CARE FACILITY: Primary | ICD-10-CM

## 2024-10-09 DIAGNOSIS — Z71.89 ACP (ADVANCE CARE PLANNING): ICD-10-CM

## 2024-10-09 DIAGNOSIS — Z12.5 SCREENING FOR PROSTATE CANCER: ICD-10-CM

## 2024-10-09 PROBLEM — H35.3131 NONEXUDATIVE AGE-RELATED MACULAR DEGENERATION, BILATERAL, EARLY DRY STAGE: Status: ACTIVE | Noted: 2024-10-09

## 2024-10-09 PROCEDURE — 3060F POS MICROALBUMINURIA REV: CPT | Performed by: STUDENT IN AN ORGANIZED HEALTH CARE EDUCATION/TRAINING PROGRAM

## 2024-10-09 PROCEDURE — 3048F LDL-C <100 MG/DL: CPT | Performed by: STUDENT IN AN ORGANIZED HEALTH CARE EDUCATION/TRAINING PROGRAM

## 2024-10-09 PROCEDURE — 1159F MED LIST DOCD IN RCRD: CPT | Performed by: STUDENT IN AN ORGANIZED HEALTH CARE EDUCATION/TRAINING PROGRAM

## 2024-10-09 PROCEDURE — 3074F SYST BP LT 130 MM HG: CPT | Performed by: STUDENT IN AN ORGANIZED HEALTH CARE EDUCATION/TRAINING PROGRAM

## 2024-10-09 PROCEDURE — G0439 PPPS, SUBSEQ VISIT: HCPCS | Performed by: STUDENT IN AN ORGANIZED HEALTH CARE EDUCATION/TRAINING PROGRAM

## 2024-10-09 PROCEDURE — 3078F DIAST BP <80 MM HG: CPT | Performed by: STUDENT IN AN ORGANIZED HEALTH CARE EDUCATION/TRAINING PROGRAM

## 2024-10-09 PROCEDURE — 1160F RVW MEDS BY RX/DR IN RCRD: CPT | Performed by: STUDENT IN AN ORGANIZED HEALTH CARE EDUCATION/TRAINING PROGRAM

## 2024-10-09 PROCEDURE — 99497 ADVNCD CARE PLAN 30 MIN: CPT | Performed by: STUDENT IN AN ORGANIZED HEALTH CARE EDUCATION/TRAINING PROGRAM

## 2024-10-09 PROCEDURE — 3008F BODY MASS INDEX DOCD: CPT | Performed by: STUDENT IN AN ORGANIZED HEALTH CARE EDUCATION/TRAINING PROGRAM

## 2024-10-09 PROCEDURE — 3051F HG A1C>EQUAL 7.0%<8.0%: CPT | Performed by: STUDENT IN AN ORGANIZED HEALTH CARE EDUCATION/TRAINING PROGRAM

## 2024-10-09 PROCEDURE — 1170F FXNL STATUS ASSESSED: CPT | Performed by: STUDENT IN AN ORGANIZED HEALTH CARE EDUCATION/TRAINING PROGRAM

## 2024-10-09 SDOH — ECONOMIC STABILITY: FOOD INSECURITY: WITHIN THE PAST 12 MONTHS, YOU WORRIED THAT YOUR FOOD WOULD RUN OUT BEFORE YOU GOT MONEY TO BUY MORE.: NEVER TRUE

## 2024-10-09 SDOH — ECONOMIC STABILITY: FOOD INSECURITY: WITHIN THE PAST 12 MONTHS, THE FOOD YOU BOUGHT JUST DIDN'T LAST AND YOU DIDN'T HAVE MONEY TO GET MORE.: NEVER TRUE

## 2024-10-09 ASSESSMENT — ENCOUNTER SYMPTOMS
CONFUSION: 0
COLOR CHANGE: 0
WHEEZING: 0
FATIGUE: 0
OCCASIONAL FEELINGS OF UNSTEADINESS: 0
COUGH: 0
UNEXPECTED WEIGHT CHANGE: 0
VOMITING: 0
ABDOMINAL PAIN: 0
DIZZINESS: 0
CONSTIPATION: 0
DEPRESSION: 0
CHILLS: 0
DIARRHEA: 0
HEADACHES: 0
FEVER: 0
LOSS OF SENSATION IN FEET: 0
SHORTNESS OF BREATH: 0
MUSCULOSKELETAL NEGATIVE: 1
PALPITATIONS: 0
NAUSEA: 0

## 2024-10-09 ASSESSMENT — ACTIVITIES OF DAILY LIVING (ADL)
GROCERY_SHOPPING: INDEPENDENT
BATHING: INDEPENDENT
DOING_HOUSEWORK: INDEPENDENT
DRESSING: INDEPENDENT
MANAGING_FINANCES: INDEPENDENT
TAKING_MEDICATION: INDEPENDENT

## 2024-10-09 ASSESSMENT — PATIENT HEALTH QUESTIONNAIRE - PHQ9
1. LITTLE INTEREST OR PLEASURE IN DOING THINGS: NOT AT ALL
SUM OF ALL RESPONSES TO PHQ9 QUESTIONS 1 & 2: 0
1. LITTLE INTEREST OR PLEASURE IN DOING THINGS: SEVERAL DAYS
SUM OF ALL RESPONSES TO PHQ9 QUESTIONS 1 & 2: 2
2. FEELING DOWN, DEPRESSED OR HOPELESS: NOT AT ALL
2. FEELING DOWN, DEPRESSED OR HOPELESS: SEVERAL DAYS

## 2024-10-09 ASSESSMENT — LIFESTYLE VARIABLES
HOW OFTEN DO YOU HAVE SIX OR MORE DRINKS ON ONE OCCASION: NEVER
HOW MANY STANDARD DRINKS CONTAINING ALCOHOL DO YOU HAVE ON A TYPICAL DAY: 1 OR 2
AUDIT-C TOTAL SCORE: 1
HOW OFTEN DO YOU HAVE A DRINK CONTAINING ALCOHOL: MONTHLY OR LESS
SKIP TO QUESTIONS 9-10: 1

## 2024-10-09 NOTE — PROGRESS NOTES
"Subjective   Patient ID: Waldemar Trujillo is a 74 y.o. male who presents for Medicare Annual Wellness Visit Subsequent.    Subjective   Reason for Visit: Waldemar Trujillo is an 74 y.o. male here for a Medicare Wellness visit.     Past Medical, Surgical, and Family History reviewed and updated in chart.    Reviewed all medications by prescribing practitioner or clinical pharmacist (such as prescriptions, OTCs, herbal therapies and supplements) and documented in the medical record.    HPI  #HM stuffs  Screening tests:  - Colonoscopy (age 45-75): 03/2020; rec to repeat in 5 yrs (after 03/2025).   - PSA (age 50 and older): 11/23; wnl.   - Lipid profile: UTD, at goal.   - AAA screening: okay for test.      Primary prevention:  - Flu shot: UTD   - RSV (age>60 yrs): UTD  - COVID vaccines: UTD   - Tdap shot: rec to get at local pharmacy   - Shingles shot (age >50): rec to get at local pharmacy   - Prevnar 20: UTD   - Statin (age 40-65 or high risk): taking      Counseling:   - ETOH (age>18): very occa   - Smoking: none     Patient Care Team:  Mariano Landers MD as PCP - General (Family Medicine)  Mariano Landers MD as PCP - [employer] PCP     Review of Systems   Constitutional:  Negative for chills, fatigue, fever and unexpected weight change.   HENT: Negative.     Respiratory:  Negative for cough, shortness of breath and wheezing.    Cardiovascular:  Negative for chest pain, palpitations and leg swelling.   Gastrointestinal:  Negative for abdominal pain, constipation, diarrhea, nausea and vomiting.   Musculoskeletal: Negative.    Skin:  Negative for color change and rash.   Neurological:  Negative for dizziness and headaches.   Psychiatric/Behavioral:  Negative for behavioral problems and confusion.        Objective   Vitals:  /79 (BP Location: Right arm, Patient Position: Sitting, BP Cuff Size: Large adult)   Pulse 72   Temp 36.2 °C (97.1 °F) (Temporal)   Resp 16   Ht 1.778 m (5' 10\")   Wt 104 kg " (230 lb)   SpO2 98%   BMI 33.00 kg/m²       Physical Exam  Vitals and nursing note reviewed.   Constitutional:       Appearance: Normal appearance. He is obese.   HENT:      Right Ear: Tympanic membrane normal.      Left Ear: Tympanic membrane normal.   Eyes:      Extraocular Movements: Extraocular movements intact.      Pupils: Pupils are equal, round, and reactive to light.   Cardiovascular:      Rate and Rhythm: Normal rate and regular rhythm.      Pulses: Normal pulses.      Heart sounds: Normal heart sounds.   Pulmonary:      Effort: Pulmonary effort is normal. No respiratory distress.      Breath sounds: Normal breath sounds.   Abdominal:      General: Abdomen is flat. Bowel sounds are normal.      Palpations: Abdomen is soft.   Musculoskeletal:         General: Normal range of motion.   Neurological:      General: No focal deficit present.      Mental Status: He is alert and oriented to person, place, and time.      Cranial Nerves: No cranial nerve deficit.      Sensory: No sensory deficit.      Motor: No weakness.   Psychiatric:         Mood and Affect: Mood normal.         Behavior: Behavior normal.         Assessment & Plan  Activated protein C resistance (Multi)  Following with cards.          Body mass index (BMI) 40.0-44.9, adult (Multi)  Lost some wt from Ozempic; cont same. Follow low calorie diet.          Routine general medical examination at health care facility  # [facility] wellness # HM visit   - Discussed ACP with pt; will work on POA/living will paper work   - Immunization per emr: rec to get shingles & Tdap at pharmacy   - UTD on age appropriate screenings as listed above in [facility] summary   - refer [facility] summary above for detail  - BW ordered as listed in emr      Orders:    1 Year Follow Up In Primary Care - Wellness Exam; Future    Follow Up In Primary Care - Medicare Annual; Future    Personal history of tobacco use, presenting hazards to health  Former smoker.   Orders:    AAA Screening, Vascular  US Abdominal Aorta; Future    Screening for prostate cancer    Orders:    PSA screen; Future    ACP (advance care planning)           Advance Directives Discussion  16 - 20 minutes were spent discussing Advanced Care Planning (including a Living Will, Medical Power Of , as well as specific end of life choices and/or directives). The details of that discussion were documented in Advanced Directives Discussion section of the medical record.      Rtc 12 mo for BG Landers MD    [place], Family Medicine

## 2024-11-05 ENCOUNTER — OFFICE VISIT (OUTPATIENT)
Dept: SLEEP MEDICINE | Facility: CLINIC | Age: 74
End: 2024-11-05
Payer: MEDICARE

## 2024-11-05 VITALS
RESPIRATION RATE: 16 BRPM | WEIGHT: 227 LBS | DIASTOLIC BLOOD PRESSURE: 78 MMHG | HEART RATE: 65 BPM | TEMPERATURE: 97.3 F | HEIGHT: 70 IN | BODY MASS INDEX: 32.5 KG/M2 | SYSTOLIC BLOOD PRESSURE: 133 MMHG | OXYGEN SATURATION: 97 %

## 2024-11-05 DIAGNOSIS — I10 BENIGN ESSENTIAL HYPERTENSION: ICD-10-CM

## 2024-11-05 DIAGNOSIS — G47.33 OSA (OBSTRUCTIVE SLEEP APNEA): Primary | ICD-10-CM

## 2024-11-05 DIAGNOSIS — E66.9 OBESITY (BMI 30-39.9): ICD-10-CM

## 2024-11-05 DIAGNOSIS — I48.91 ATRIAL FIBRILLATION, UNSPECIFIED TYPE (MULTI): ICD-10-CM

## 2024-11-05 PROCEDURE — 99214 OFFICE O/P EST MOD 30 MIN: CPT | Performed by: INTERNAL MEDICINE

## 2024-11-05 PROCEDURE — 1159F MED LIST DOCD IN RCRD: CPT | Performed by: INTERNAL MEDICINE

## 2024-11-05 PROCEDURE — 3048F LDL-C <100 MG/DL: CPT | Performed by: INTERNAL MEDICINE

## 2024-11-05 PROCEDURE — G2211 COMPLEX E/M VISIT ADD ON: HCPCS | Performed by: INTERNAL MEDICINE

## 2024-11-05 PROCEDURE — 3008F BODY MASS INDEX DOCD: CPT | Performed by: INTERNAL MEDICINE

## 2024-11-05 PROCEDURE — 3078F DIAST BP <80 MM HG: CPT | Performed by: INTERNAL MEDICINE

## 2024-11-05 PROCEDURE — 3060F POS MICROALBUMINURIA REV: CPT | Performed by: INTERNAL MEDICINE

## 2024-11-05 PROCEDURE — 3075F SYST BP GE 130 - 139MM HG: CPT | Performed by: INTERNAL MEDICINE

## 2024-11-05 PROCEDURE — 3051F HG A1C>EQUAL 7.0%<8.0%: CPT | Performed by: INTERNAL MEDICINE

## 2024-11-05 PROCEDURE — 99204 OFFICE O/P NEW MOD 45 MIN: CPT | Performed by: INTERNAL MEDICINE

## 2024-11-05 PROCEDURE — 1160F RVW MEDS BY RX/DR IN RCRD: CPT | Performed by: INTERNAL MEDICINE

## 2024-11-05 ASSESSMENT — SLEEP AND FATIGUE QUESTIONNAIRES
DIFFICULTY_FALLING_ASLEEP: MILD
HOW LIKELY ARE YOU TO NOD OFF OR FALL ASLEEP IN A CAR, WHILE STOPPED FOR A FEW MINUTES IN TRAFFIC: WOULD NEVER DOZE
HOW LIKELY ARE YOU TO NOD OFF OR FALL ASLEEP WHILE SITTING AND TALKING TO SOMEONE: WOULD NEVER DOZE
HOW LIKELY ARE YOU TO NOD OFF OR FALL ASLEEP WHILE SITTING QUIETLY AFTER LUNCH WITHOUT ALCOHOL: WOULD NEVER DOZE
SITING INACTIVE IN A PUBLIC PLACE LIKE A CLASS ROOM OR A MOVIE THEATER: SLIGHT CHANCE OF DOZING
DIFFICULTY_STAYING_ASLEEP: MILD
SATISFACTION_WITH_CURRENT_SLEEP_PATTERN: SATISFIED
SLEEP_PROBLEM_INTERFERES_DAILY_ACTIVITIES: A LITTLE
HOW LIKELY ARE YOU TO NOD OFF OR FALL ASLEEP WHILE LYING DOWN TO REST IN THE AFTERNOON WHEN CIRCUMSTANCES PERMIT: SLIGHT CHANCE OF DOZING
SLEEP_PROBLEM_NOTICEABLE_TO_OTHERS: NOT AT ALL NOTICEABLE
ESS-CHAD TOTAL SCORE: 2
HOW LIKELY ARE YOU TO NOD OFF OR FALL ASLEEP WHILE WATCHING TV: WOULD NEVER DOZE
WORRIED_DISTRESSED_DUE_TO_SLEEP: NOT AT ALL NOTICEABLE
HOW LIKELY ARE YOU TO NOD OFF OR FALL ASLEEP WHILE SITTING AND READING: WOULD NEVER DOZE
HOW LIKELY ARE YOU TO NOD OFF OR FALL ASLEEP WHEN YOU ARE A PASSENGER IN A CAR FOR AN HOUR WITHOUT A BREAK: WOULD NEVER DOZE
WAKING_TOO_EARLY: MILD

## 2024-11-05 NOTE — PROGRESS NOTES
[facility] SLEEP MEDICINE   NEW CONSULT VISIT NOTE      PCP: Mariano Landers MD  Referred by: No ref. provider found    HISTORY OF PRESENT ILLNESS     Patient ID: Waldemar Trujillo is a 74 y.o. male who presents to [facility] for a comprehensive sleep medicine evaluation with concerns of sleep apnea with CPAP intolerance.    Patient is here alone today.  To review, patient's medical history is notable for obesity (BMI 32), HTN, atrial fibrillation on Eliquis, HFpEF, T2DM on insulin, CKD stage 4, CAD s/p CABG x4 in 2000,  and KENNEY on BPAP    Patient was diagnosed with KENNEY in year 2005 by PSG after getting atrial fibrillation post-CABG. He was scripted with CPAP but was not compliant. Repeat PSG in 2018 showed worsening of AHI with weight gain (BMI 51).  PAP titration study in 2019 showed CPAP intolerance' hence, he ended up getting BPAP which he used intermittently for a few months then stopped in 2021 due to machine recall. When he got the replacement machine from [employer] in Oct 2021 after waiting for a long time, he did not use it much because he felt that his KENNEY was not given much importance. Complains of mask intolerance (ie: mask feels uncomfortable or patient feels claustrophobic with mask). Patient states that he would rip off mask in the middle of sleep even before the machine recall. Also, he complains of machine being noisy.  The following are patient's perceived benefits of PAP: it did not seem to make much of a difference.    Resident's additional history:  Heart doctor recently told Mr. Trujillo to come in due to afib, diagnosed first after open heart surgery in 2000. Per prior records, he had a repeat home sleep study done 3/2024 by his cardiologist and it showed moderate KENNEY, referred to sleep medicine by cardiology then.   Has felt tired during day, nothing too extreme. Doesn't consider it to be a big problem.   No naps during day.   Diagnosed with  sleep apnea and afib after 2000 heart surgery.   Used CPAP machine, used for months, it was thrown away due to being “cancerous”. Has been years since stopped. Saw a sleep doctor here in this building before.   Mask wasn't comfortable before, would pull it off before.   Not waking up at night currently. Denies falling asleep during the day.   Seems to have congestion during spring, seasonal, uses vicks inhaler works ok.   He has lost 140 pounds over 2 years due to seeing functional medicine then and has been on Ozempic in the last 3 months.     SLEEP STUDY HISTORY (personally reviewed raw data such as interpretation report, data sheet, hypnogram, and titration table if available and applicable)  PSG 10/6/2005: AHI 29.6, REM AHI 49.3,   PAP titration 12/5/2005: Optimal pressure noted at CPAP 8 cm H2O.   PSG 11/28/2018: AHI 35, supine AHI 41, REM AHI 60, SpO2 radha 79% (BMI 51)  PAP titration 1/2/2019: CPAP was started at 5-9 cm H2O. Due to CPAP intolerance, BPAP was started at 9/5 cm H2O and gradually increased to 13/9 cm H2O. Last obstructive apnea seen at CPAP 9 cm H2O. At BPAP 13/9 cm H2O with supine sleep, AHI 14, SpO2 radha 81%, At BPAP 11/7 cm H2O and BPAP 12/8 cm H2O with non-supine sleep, sleep apnea seemed to be much improved.  HSAT 3/13/2024: REI3% 19.8 (Supine REI3% 24.5, non-supine REI3% 10.4), REI4% 15.1 (supine REI4% 19.7, non-supine REI4% 6.1), SpO2 radha 72%, spent 19 minutes with SpO2<89% (BMI 35.6)    SLEEP-WAKE SCHEDULE  Bedtime:  10:30 PM lays in bed, falls asleep 12 AM  Subjective sleep latency: 1.5h   Difficulty falling asleep: Yes  Number of awakenings: none. Patient sleeps thru the night.  Nocturia: No used to but hasn't happened in years.   Falls back asleep right away  Final wake time:  6:30 to 7 AM daily  Out of bed time:  stays in bed 5 to 10min   Shift work: No  Naps: no naps  Average sleep duration (excluding naps): 6.5 to 7h      SLEEP ENVIRONMENT  Sleep location: bed  Sleep status:  "sleeps with pet dogs 2, mutts, rescues  Preferred sleep position: back and right side  TV in bedroom: Yes watches TV before sleeping starting around 10:30 PM  Room is dark: Yesv  Room is quiet: Yes  Room is cool: Yes    SLEEP HABITS  Activities before bedtime: takes dogs out at 10 PM before bedtime   Activities in bed: watches TV before sleeping around 10:30 PM   Clock-watching: No  Smoking: stopped tobacco year 2000  ETOH: 1-2 beers a month   Marijuana: marijuana nightly before bed for sleep. Has a bad back from lifting wheelchair bound daughter over the years take sit for pain too.   Caffeine: denies, usually decaf .   Sleep aids: denies except marijuana     SLEEP ROS  Night symptoms: POSITIVE for snoring before weight loss, witnessed apnea before weight loss, nasal congestion , mouth breathing, and NEGATIVE for wake up gasping and/or choking for air, night sweats during sleep, waking up with racing heart, heartburn or sour taste in mouth at night, nocturnal cough, and nocturia, wife has told pt that he snored \"years\" ago and has had witnessed apnea episodes by wife years ago.   Morning symptoms: POSITIVE for refreshing sleep and NEGATIVE for morning headache, morning dry mouth, and morning sore throat   Daytime symptoms POSITIVE for fatigue and NEGATIVE for excessive daytime sleepiness, trouble remembering things in daytime, trouble staying focused in daytime, irritability in daytime, and drowsy driving   Hypersomnia / narcolepsy symptoms: Patient denies symptoms of sleep paralysis, sleep-related hallucinations, and cataplexy.   Parasomnia symptoms: Patient denies symptoms of parasomnia such as sleepwalking, sleeptalking, sleep-eating, acting out dreams, and nightmares.   Movements in sleep: Patient denies problematic movements in sleep such as seizures during sleep, frequent leg kicks / jerks while asleep, sleep-related bruxism, and waking up with bedsheets in disarray. .    RLS screen: Patient denies RLS " "symptoms.    WEIGHT: lost 140 lbs over 2 years, got into functional medicine program then and has been prescribed Ozempic recently for 3 months.      Claustrophobia: Yes    REVIEW OF SYSTEMS     All other systems have been reviewed and are negative.    ALLERGIES     Allergies   Allergen Reactions    Ace Inhibitors Other    Atorvastatin Other       MEDICATIONS     Current Outpatient Medications   Medication Sig Dispense Refill    alpha lipoic acid 600 mg capsule Take 800 mg by mouth in the morning and 800 mg before bedtime.      amLODIPine (Norvasc) 5 mg tablet Take 1 tablet (5 mg) by mouth once daily. 90 tablet 3    apixaban (Eliquis) 5 mg tablet Take 1 tablet (5 mg) by mouth 2 times a day. 180 tablet 3    BD Ultra-Fine Mini Pen Needle 31 gauge x 3/16\" needle Inject 1 each under the skin once daily at bedtime. 100 each 3    coenzyme Q-10 200 mg capsule Take by mouth.      ezetimibe (Zetia) 10 mg tablet Take 1 tablet (10 mg) by mouth once daily. 90 tablet 3    famotidine (Pepcid) 20 mg tablet Take 1 tablet (20 mg) by mouth every 12 hours.      Farxiga 10 mg Take 1 tablet (10 mg) by mouth once daily. 90 tablet 1    ferrous gluconate 236 mg (27 mg iron) tablet Take 1 tablet (27 mg) by mouth 2 times a week.      glimepiride (AmaryL) 2 mg tablet Take 1 tablet (2 mg) by mouth once daily in the morning. Take before meals. 90 tablet 1    insulin degludec (Tresiba FlexTouch) 100 unit/mL (3 mL) injection Inject 18 Units under the skin once daily at bedtime. 5 each 5    Kerendia 20 mg tablet Take 1 tablet (20 mg) by mouth.      medical cannabis Take 1 each by mouth.      miscellaneous medical supply (Blood Pressure Cuff) misc Daily home blood pressure monitoring 1 each 0    nitroglycerin (Nitrostat) 0.4 mg SL tablet Place 1 tablet (0.4 mg) under the tongue every 5 minutes if needed for chest pain.      omega-3 acid ethyl esters (Lovaza) 1 gram capsule Take 1 capsule (1 g) by mouth 2 times a day. 180 capsule 3    rosuvastatin " "(Crestor) 40 mg tablet Take 1 tablet (40 mg) by mouth once daily. 90 tablet 3    semaglutide (Ozempic) 2 mg/dose (8 mg/3 mL) pen injector Inject 2 mg under the skin 1 (one) time per week. 3 mL 5    sodium bicarbonate 650 mg tablet Take 1 tablet (650 mg) by mouth.      torsemide (Demadex) 10 mg tablet Take 1 tablet (10 mg) by mouth once daily.      semaglutide (Ozempic) 1 mg/dose (4 mg/3 mL) pen injector Inject 1 mg under the skin every 7 days. 3 mL 0     No current facility-administered medications for this visit.       PAST HISTORIES     PERTINENT PAST MEDICAL HISTORY: See HPI    PERTINENT PAST SURGICAL HISTORY for Sleep Medicine:  tonsillectomy and adenoidectomy     PERTINENT FAMILY HISTORY for Sleep Medicine:  Unknown, pt unsure, parents , sibilings  one brother in [facility]     PERTINENT SOCIAL HISTORY:  He  reports that he has quit smoking. His smoking use included cigarettes. He does not have any smokeless tobacco history on file. He reports that he does not currently use alcohol. He reports current drug use. Frequency: 1.00 time per week. Drug: Marijuana. He currently lives with spouse and retired from work. Previous occupation was    retired teacher. 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     Active Problems, Allergy List, Medication List, and PMH/PSH/FH/Social Hx have been reviewed and reconciled in chart. No significant changes unless documented in the pertinent chart section. Updates made when necessary.     PHYSICAL EXAM     VITAL SIGNS: /78   Pulse 65   Temp 36.3 °C (97.3 °F)   Resp 16   Ht 1.778 m (5' 10\")   Wt 103 kg (227 lb)   SpO2 97%   BMI 32.57 kg/m²     NECK CIRCUMFERENCE: 17 inches    ESS: 2  MARIANA: 5  STOPBAN    Physical Exam  Constitutional: Awake, not in distress  Head: normocephalic, atraumatic  Craniofacial: no retrognathia  Sinus: no tenderness to palpation  Nose: no nasal congestion, + bilateral inferior turbinate " "hypertrophy  Dental: Class 1 bite, + overjet, no crossbite  Palate: Normal   Oropharynx: Rodriguez tongue position 3, Mallampati 3, lateral wall narrowing grade 1   Tonsils: surgically absent  Uvula: midline on phonation, not elongated, not swollen  Tongue: Midline on protrusion  Lungs: Clear to auscultation bilateral, no rales  Heart: Regular rate and rhythm, no murmurs  Skin: Warm, no rash  Neuro: No tremors, moves all extremities  Extremities: no pitting edema to legs   Psych: alert and oriented to time, place, and person    RESULTS/DATA     No results found for: \"IRON\", \"TRANSFERRIN\", \"IRONSAT\", \"TIBC\", \"FERRITIN\"    Bicarbonate   Date Value Ref Range Status   10/03/2024 27 21 - 32 mmol/L Final     PAP Adherence  A PAP adherence data was obtained and reviewed personally today in clinic. (see scanned document in EPIC)  Machine: [employer] Dreamstation auto-BPAP version 1.2  Settings:  BPAP-S 12/8 cm H2O and Flex 3  Date Range: 10/17/2021 to 1/14/2022  Days used: 2/90  >4 hrs usage: 0%  Average usage hours (days used): 14 seconds  Leak: time in large leak 0  AHI: 0 (Of note, usage was only less than 1 minute)    ASSESSMENT/PLAN     Waldemar Trujillo is a 74 y.o. male who is seen today in [facility] for the following problems:.     OBSTRUCTIVE SLEEP APNEA, MODERATE positional (HSAT REI3% 19.8, REI4% 15.1)  SLEEP-RELATED HYPOXEMIA  - Personally reviewed the sleep study's raw data such as interpretation report, data sheet, and hypnogram. A copy of recent testing was either given to patient or released in [employer]. See HPI for detailed summary of sleep study results.  - Discussed sleep study results and treatment options with patient.  - Of note, patient had CPAP intolerance when his BMI was 51 in 2019.  Patient lost more than 100 lbs and BMI is now down to 32.  - Recommend starting auto-CPAP 5-15 cm H2O with EPR 3, heated humidification, heated tubings, and mask fitting session. Orders " sent to [employer].  - Mask refit done in clinic today. The following sample mask was given to patient today: [employer]med Airfit [id number] nasal mask  - Discussed 30-day mask guarantee and insurance requirement regarding PAP compliance and follow-up.   - Advised about cleaning instructions and replacement schedule of PAP accessories.  - Discussed sleep apnea in detail to include pathophysiology, risk factors, diagnostic options, treatment options, and cardiovascular / neurologic consequences of untreated KENNYE.   - Supportive management as follows: Lose weight. Stay off your back when sleeping. Avoid smoking, alcohol, and sedating medications if applicable. Don't drive when sleepy.    OBESITY   - Recommend weight loss with diet and exercise.  - Weight loss can help in long term management of KENNEY.  - Defer management to PCP.    HYPERTENSION  - BP stable today.  - Continue anti-hypertensive medications per PCP.  - Supportive management: low salt DASH diet (less than 2000 mg sodium intake daily), moderate intensity aerobic exercise at least 30 minutes 5 days per week, reduce stress, quit smoking, limit alcohol, lose weight, and monitor BP once daily  - PCP following. Defer management to PCP.    ATRIAL FIBRILLATION  - Continue meds per Cardio.  - Cardio following. Defer management to Cardio.    Follow-up in 31-90 days after getting new machine.    All of patient's questions were answered. He verbalizes understanding and agreement with my assessment and plan. Refer to after-visit-summary (AVS) for patient education and more details on sleep study preparation, troubleshooting issues with PAP usage, proper cleaning instructions of PAP supplies, and usual recommended replacement schedule for each of the PAP supplies.

## 2024-11-06 ENCOUNTER — LAB (OUTPATIENT)
Dept: LAB | Facility: LAB | Age: 74
End: 2024-11-06
Payer: MEDICARE

## 2024-11-06 ENCOUNTER — HOSPITAL ENCOUNTER (OUTPATIENT)
Dept: VASCULAR MEDICINE | Facility: HOSPITAL | Age: 74
Discharge: HOME | End: 2024-11-06
Payer: MEDICARE

## 2024-11-06 DIAGNOSIS — Z87.891 PERSONAL HISTORY OF TOBACCO USE, PRESENTING HAZARDS TO HEALTH: ICD-10-CM

## 2024-11-06 DIAGNOSIS — Z12.5 SCREENING FOR PROSTATE CANCER: ICD-10-CM

## 2024-11-06 DIAGNOSIS — Z13.6 ENCOUNTER FOR SCREENING FOR CARDIOVASCULAR DISORDERS: ICD-10-CM

## 2024-11-06 LAB — PSA SERPL-MCNC: 2.51 NG/ML

## 2024-11-06 PROCEDURE — 76706 US ABDL AORTA SCREEN AAA: CPT

## 2024-11-06 PROCEDURE — 76706 US ABDL AORTA SCREEN AAA: CPT | Performed by: SURGERY

## 2024-11-06 NOTE — PATIENT INSTRUCTIONS
"Thank you for coming to the Sleep Medicine Clinic today! Your sleep medicine provider today was: Twyla Story MD Below is a summary of your treatment plan, patient education, other important information, and our contact numbers.      TREATMENT PLAN     - Start auto-CPAP. Order placed and sent to [facility].  - Please read the \"Patient Education\" section below for more detailed information. Try implementing tips, reminders, strategies, and supportive management.   - If not yet done, please sign up for Mement[employer] to make a future schedule, send prescription requests, or send messages.    Follow-up Appointment:   Follow-up in 31-90 days after getting new machine.    PATIENT EDUCATION     OBSTRUCTIVE SLEEP APNEA (KENNEY) is a sleep disorder where your upper airway muscles relax during sleep and the airway intermittently and repetitively narrows and collapses leading to partially blocked airway (hypopnea) or completely blocked airway (apnea) which, in turn, can disrupt breathing in sleep, lower oxygen levels while you sleep and cause night time wakings. Because both apnea and hypopnea may cause higher carbon dioxide or low oxygen levels, untreated KENNEY can lead to heart arrhythmia, elevation of blood pressure, and make it harder for the body to consolidate memory and facilitate metabolism (leading to higher blood sugars at night). Frequent partial arousals occur during sleep resulting in sleep deprivation and daytime sleepiness. KENNEY is associated with an increased risk of cardiovascular disease, stroke, hypertension, and insulin resistance. Moreover, untreated KENNEY with excessive daytime sleepiness can increase the risk of motor vehicular accidents.    Below are conservative strategies for KENNEY regardless of KENNEY severity are:   Positional therapy - Avoid sleeping on your back.   Healthy diet and regular exercise to optimize weight is highly encouraged.   Avoid alcohol late in the evening and sedative-hypnotics as " these substances can make sleep apnea worse.   Improve breathing through the nose with intranasal steroid spray, saline rinse, or antihistamines    Safety: Avoid driving vehicle and operating heavy equipment while sleepy. Drowsy driving may lead to life-threatening motor vehicle accidents. A person driving while sleepy is 5 times more likely to have an accident. If you feel sleepy, pull over and take a short power nap (sleep for less than 30 minutes). Otherwise, ask somebody to drive you.    Treatment options for sleep apnea include weight management, positional therapy, Positive Airway Therapy (PAP) therapy, oral appliance therapy, hypoglossal nerve stimulator (Inspire) and select airway surgeries.    Starting Positive Airway Pressure (PAP): You were ordered a device to wear when you sleep called PAP (Positive Airway Pressure) to treat your sleep apnea. The order will be submitted to a durable medical equipment (DME) company who will arrange setting you up with the device. They will provide all the necessary equipment and discuss use and maintenance of the device with you as well as mask fitting and process of replacing / renewing PAP supplies or accessories. Once you get the machine, please start using it immediately. You may not be successful right away and that is okay. [place] be certain that you keep trying nightly and reach out to DME if you are struggling or having issues with machine usage.     *Please follow-up with me in 1-2 months of starting CPAP to see how well it is working for you and to do some troubleshooting if needed. Also, please bring all PAP equipment with you to follow up appointments unless told otherwise.     Important things to keep in mind as you start PAP:  Insurance will monitor your usage during the first 90 days. You should use your PAP - all night, every night, and including all naps (especially if naps are more than 30 minutes) for your health. The bare minimum is to use your PAP  device while sleeping for at least 4 hours per day at least 5-6 days per week.. Otherwise, your PAP device will be reclaimed by your DME company at 90 days.  There are many masks to choose from to wear with your PAP machine. If you are not comfortable with the first mask issued to you, call your DME company and ask for another option to try. You typically have a 30-day mask guarantee from the day you received your machine.   Discuss with your provider if you are having issues breathing with the machine or if the temperature or humidity feel uncomfortable.  Expect to have an adjustment period when you start your device. It helps to continuing wearing the machine every day for a period of time until you get more used to it. You can practice with wearing the mask alone if you need, then add in the PAP air pressure a few days later.   Reach out for help if you are struggling! The sleep medicine department can be reached at [phone]  We encourage you to download data monitoring apps to your phone. For CIQUAL 10/11 - MyAir dano. For [employer] Life - DreamMapper. Both apps are available in the Dano store for free and are a great tool to monitor your progress with your PAP device night to night.    Tips for success with PAP machine usage:  Comfortable and well-fitting mask  Appropriate pressure on the machine  Using humidification  Support from bed partner and clinical team      Maintaining your CPAP/BPAP device:    The humidification chamber (aka water tank or water chamber) needs to be filled with distilled water to prevent buildup of white deposits in the future. If you cannot find distilled water, you can use tap water but expect to have white deposits buildup seen after prolonged use with tap water. If you start seeing white deposits on the water chamber, you can clean it by filling it with equal parts of distilled white vinegar and water. Let the vinegar-water mixture sit for 2 hours, and then  rinse it with running tap water. Clean with soap and water then let it dry.     You should try to keep your machine clean in order to work well. Here are some tips to clean PAP supplies / accessories:    Clean the humidification chamber (aka water tank) as well as your mask and tubing at least once a week with soap and water.   Alternatively, you can fill a sink or basin with warm water and add a little mild detergent, like Ivory dish soap. Gently wipe your supplies with the soapy water to free all the oils and dirt that may have collected. Once that's done, rinse these items with clean water until the soap is gone and let them air dry. You can hang your tubing over the curtain eder in your bathroom so that it dries.  The mask insert (part of the mask that has contact with your skin) needs to be cleaned with soap and water daily. Another option is to wipe them down with CPAP wipes or baby wipes.    You should replace your mask and tubing frequently in order to prevent bacteria buildup, machine damage, and mask seal issues. The older the mask and hose, the high likelihood that there is bacteria buildup in it especially if they are not cleaned regularly. Dirty filters damage machines because build-up of dust and contaminants can cause machine to over-heat, and in time, damage the motor of machine. Cushions lose their seal over time as most masks are made of plastic and silicone while headgear is made of neoprene. These materials will break down with age and frequent use. Here is the recommended replacement schedule for PAP supplies / accessories:    Twice a month- disposable filters and cushions for nasal mask or nasal pillows.  Once a month- cushion for full face mask  Every 3 months- mask with headgear and PAP tubing (standard or heated hose)  Every 6 months- reusable filter, water chamber, and chin strap     Other useful information:    Some people do not put water in the tank while other people prefers to put water  in the tank to prevent mouth dryness. Try to experiment to determine which is more comfortable for you.   In general, new machines have 2 years warranty on parts while health insurance allows you to have a new machine once every 5 years.     Common issues with PAP machine:    Mask gets dislodged when turning to the side: Consider getting a CPAP pillow or switching to a mask with hose on top.     Dry mouth:  Your machine has built-in humidifier that heats up the air to prevent dry mouth. It can be adjusted to your comfort. You can try that first and increase setting one level one night at a time to check which setting is comfortable and effective in lessening dry mouth. In some patients with heated hose, adjusting tube temperature to make air warmer can improve dry mouth. If dry mouth persists despite adjusting humidity or tube temperature setting, may apply OTC Biotene gel over the gums at bedtime.  If Biotene gel is not effective, consider trying XEROSTOM gel from [employer].  Also, eliminate or reduce dose of medications that can cause dry mouth if possible. Lastly, may try getting a separate room humidifier machine.    Airleaks: Please call DME as they may need to adjust your mask or refit you with a different kind or different size of mask. In addition, you can ask DME for tips on getting a good mask seal and mask fit.     Difficulty tolerating the mask: Contact your DME to try a different kind of mask and/or call office to get a referral to Sleep Psychologist for CPAP desensitization. CPAP desensitization technique is a set of strategies that helps patient cope with claustrophobia and anxiety related to wearing mask. Alternatively, we can do a daytime mini-sleep study called PAP-nap trial wherein you will try on different kinds of mask and the sleep technician will try different pressure settings on CPAP and BPAP machines to see which specific pressure is tolerable and comfortable for you.     Water droplets or  "moisture within the hose and/or mask: This is called rain-out and it is caused by condensation of too much heated humidity on the cooler walls of the hose. If you have rain-out, turn down humidity settings or get a heated hose. If you already have a heated hose, turn up the \"tube temperature\" of the heated hose. Alternatively, if you don't want to get a heated hose or warmer air, may wrap the CPAP hose with stockings to keep it somewhat warm. Also, you need to place the machine on the floor and lower the hose so that water won't travel upward towards your mask.     PAP desensitization techniques: If you have concerns about something being on your face at night, you can start by getting used to it before trying to sleep with it as follows:      Sit in a comfortable chair or bed. Connect the mask and hose to the CPAP/BPAP machine. Hold the mask on your face (without straps on) and turn on the machine. Practice breathing with the mask on while awake sitting and watching television, reading, or performing a sedentary activity during the day for 5-10 minutes and then take it off.  If tolerated, try again and gradually build up to longer periods of time. If not tolerated, try and try again until it is more comfortable as you become more desensitized. If you are able to use it for at least 20-30 minutes, move unto the next step.     Sit in a comfortable chair or bed. Connect the mask and hose to the CPAP/BPAP machine. Strap the mask on your head and turn on the machine. Practice breathing with the mask and headgear on while awake sitting and watching television, reading, or performing a sedentary activity. Start with 5-10 minutes and gradually increasing time until you can wear it comfortably for at least 20-30 minutes, then move to the next step.    Take a shorter daytime nap with machine turned on while you are in a reclined position in bed, sofa, or recliner. Start with 5-10 minute nap and gradually increase up to 30 " minutes. It is not important whether you fall asleep or not. The goal is to rest comfortably with PAP machine on.     Reintroduce PAP machine into nighttime sleep. You can begin using it a portion of the night and gradually increase up to entire night.     Proceed from one step to the next only when you are completely comfortable. If you feel any anxiety or discomfort, return to the previous step, then proceed again when comfortable.    Expect to “work” with your CPAP/BIPAP unit. It is important to try to relax when beginning CPAP/BIPAP therapy. Inhalation and exhalation should occur through the nose only. If you are unable to consistently breathe this way, do not panic or lose hope. There are other types of masks which allow you to breathe through your nose and/or your mouth. Also, in some patients, using intranasal steroid spray (e.g. Flonase or Nasocort or Fluticasone) 1 hour before bedtime and/or before putting on CPAP mask can help tolerate breathing through the mask.    Don't give up after a few attempts--some patients adjust quickly, while some patients need 3-4 weeks (or sometimes even longer) to be accustomed to CPAP therapy.  Contact your sleep medicine specialist if you have a significant change in weight since this may affect your pressure.    You can also go to the following EDUCATION WEBSITES for further information:   American Academy of Sleep Medicine http://sleepeducation.org  National Sleep Foundation: https://sleepfoundation.org  American Sleep Apnea Association: https://www.sleepapnea.org (for patients with sleep apnea)  Narcolepsy Network: https://www.narcolepsynetwork.org (for patients with narcolepsy)  [employer]: https://www.wakeupnarcolepsy.org (for patients with narcolepsy)  Hypersomnia Foundation: https://www.hypersomniafoundation.org (for patients with idiopathic hypersomnia)  RLS foundation: https://www.rls.org (for patients with restless leg syndrome)    IMPORTANT INFORMATION      Call 911 for medical emergencies.  Our offices are generally open from Monday-Friday, 8 am - 5 pm.   There are no supporting services by either the sleep doctors or their staff on weekends and Holidays, or after 5 PM on weekdays.   If you need to get in touch with me, you may either call my office number or you can use ChemDAQ.  If a referral for a test, for CPAP, or for another specialist was made, and you have not heard about scheduling this within a week, please call scheduling at [phone] ([phone]).  If you are unable to make your appointment for clinic or an overnight study, kindly call the office or sleep testing center at least 48 hours in advance to cancel and reschedule.  If you are on CPAP, please bring your device's card and/or the device to each clinic appointment.   In case of problems with PAP machine or mask interface, please contact your DME (Durable Medical Equipment) company first. DME is the company who provides you the machine and/or PAP supplies.       PRESCRIPTIONS     We require 7 days advanced notice for prescription refills. If we do not receive the request in this time, we cannot guarantee that your medication will be refilled in time.    IMPORTANT PHONE NUMBERS     Sleep Medicine Clinic Fax: [phone]  Appointments (for Pediatric Sleep Clinic): [phone] - option 1  Appointments (for Adult Sleep Clinic): [phone] - option 2  Appointments (For Sleep Studies): [phone] - option 3  Behavioral Sleep Medicine: [phone]  Sleep Surgery: [phone]  Nutrition Service: [phone]  Weight management clinics with endocrinology: [phone]  Bariatric Services: [phone] (includes weight loss medications and weight loss surgery)  [facility]: [phone] (offers holistic approaches to weight management)  ENT (Otolaryngology): [phone]  Headache Clinic (Neurology): [phone]  Neurology: [phone]  Psychiatry:  [phone]  Pulmonary Function Testing (PFT) Center: [phone]  Pulmonary Medicine: [phone]  Medical Service [employer] [facility] ([employer]): [phone]      OUR SLEEP TESTING LOCATIONS     Our team will contact you to schedule your sleep study, however, you can contact us as follow:  Main Phone Line (scheduling only): [phone] ([phone]), option 3  Adult and Pediatric Locations  [facility] (6 years and older): [facility] - 4th floor ([address] [facility]) After hours line: [phone]  [facility] at [facility] (Main campus: All ages): [facility], 6th floor. After hours line: [phone]   [place] (5 years and older; younger considered on case-by-case basis): [address]; [address], [address]. Scheduling  After hours line: [phone]   [place] (6 years and older): [address]; [address]; [address]   [place] (6 years and older): [address] [facility] [facility], [address]  After hours line: [phone]   [place] (13 years and older) in [place]: [address], 2nd floor  After hours line: [phone]   [place] (13 year and older): [address]  After hours line: [phone]     Adult Only Locations:   [place] (18 years and older): [address] [facility] [address], 2nd floor   [place] (18 years and older): [address]; 4th floor  After hours line: [phone]  [facility] (18 years and older) at [place]: [address] [facility] [address]  After hours line: [phone]     CONTACTING YOUR SLEEP MEDICINE PROVIDER AND SLEEP TEAM      For issues with your machine or mask interface, please call your DME provider first. DME stands for durable medical company. DME is the company who provides you the machine and/or PAP supplies / accessories.   To schedule, cancel, or reschedule SLEEP STUDY APPOINTMENTS, please call the Main Phone Line at [phone] - option 3.   To schedule, cancel, or reschedule CLINIC  "APPOINTMENTS, you can do it in \"MyChart\", call [phone] (direct line) to speak with my practice lead (Marlene Alfaro), or call the Main Phone Line at [phone] - option 2  For CLINICAL QUESTIONS or MEDICATION REFILLS, please call direct line for Adult Sleep Nurses at [phone].   Lastly, you can also send a message directly to your provider through \"My Chart\", which is the email service through your  Records Account: [email or web address]       Here at [facility], we wish you a restful sleep!    "

## 2024-11-07 ENCOUNTER — APPOINTMENT (OUTPATIENT)
Dept: VASCULAR MEDICINE | Facility: HOSPITAL | Age: 74
End: 2024-11-07
Payer: MEDICARE

## 2024-11-13 ENCOUNTER — TELEPHONE (OUTPATIENT)
Dept: CARDIOLOGY | Facility: HOSPITAL | Age: 74
End: 2024-11-13
Payer: MEDICARE

## 2024-11-13 NOTE — TELEPHONE ENCOUNTER
Sleep lab called asking for more information on the reasoning for this patient to be refereed to the sleep lab. Specifically the #5 hypertension from Feb 27. Provided a phone [phone] and a fax of [phone].

## 2024-11-21 NOTE — TELEPHONE ENCOUNTER
Returned the call.  They need the documentation of the symptoms he had that triggered the sleep study.  He had a home study that showed moderate obstructive sleep apnea but is not able to get a machine to treat this without the note documenting his symptoms the study was ordered for.  Phone [phone]  Fax [phone].    Faxed over Dr. Nassar's note from 12/6/23 that stated:  No new symptoms. No change in diet. No increased alcohol use. Patient has prior history of sleep apnea but the machine was recalled and then he lost 100 pounds and never did any follow-up about sleep apnea treatment.

## 2024-11-26 ENCOUNTER — TELEPHONE (OUTPATIENT)
Dept: CARDIOLOGY | Facility: HOSPITAL | Age: 74
End: 2024-11-26
Payer: MEDICARE

## 2024-11-26 NOTE — TELEPHONE ENCOUNTER
"Medical service Company process orders to get CPAP to patient, patient recently had sleep study done, and Medical service company needs diagnosis of actual sleep apnea in notes, such as  \" Any day time sleepiness, snoring, or something\" fax note to [phone]   Phone [phone] option 5   "

## 2024-12-12 ENCOUNTER — APPOINTMENT (OUTPATIENT)
Dept: CARDIOLOGY | Facility: CLINIC | Age: 74
End: 2024-12-12
Payer: MEDICARE

## 2024-12-16 ENCOUNTER — TELEPHONE (OUTPATIENT)
Dept: CARDIOLOGY | Facility: HOSPITAL | Age: 74
End: 2024-12-16
Payer: MEDICARE

## 2024-12-16 NOTE — TELEPHONE ENCOUNTER
Patient wife is on the phone and said the mail pharmacy is taking forever to get his medication. It was in [place] but now [place] he has been out of meds for a few days and wanted to know if we can send a 30day to [facility] in [place]. The medication is rosuvastatin (Crestor) 40 mg tablet     Messaged the nurse but was left on read for this information     Couldn't move forward to assist the patient

## 2024-12-27 ENCOUNTER — TELEPHONE (OUTPATIENT)
Dept: CARDIOLOGY | Facility: HOSPITAL | Age: 74
End: 2024-12-27
Payer: MEDICARE

## 2024-12-27 NOTE — TELEPHONE ENCOUNTER
12/27 - Received VM from Yessi with [employer] - requesting further clinical documentation to support need for CPAP. Faxed Dr. Story's most recent office note, note from Mindi MONTERO 11/21, as well as HSAT report from March 2024 to [phone]. Phone # is [phone]. Informed [facility] of fax. Requested company reach out if anything further is needed.

## 2025-02-06 RX ORDER — PEN NEEDLE, DIABETIC 31 GX5/16"
NEEDLE, DISPOSABLE MISCELLANEOUS
COMMUNITY
Start: 2024-11-13 | End: 2025-03-07 | Stop reason: SDUPTHER

## 2025-02-12 NOTE — PROGRESS NOTES
"Subjective   Waldemar Trujillo is a 74 y.o. male who presents for follow-up of Type 2 diabetes mellitus. The initial diagnosis of diabetes was made in 2007 .     He has had no major changes to his health since his last appointment.  He tripped and twisted his right knee four days ago.      Known complications due to diabetes included CAD s/p CABG, chronic kidney disease, and obesity    Cardiovascular risk factors include advanced age (older than 55 for men, 65 for women), diabetes mellitus, male gender, and obesity (BMI >= 30 kg/m2). The patient is on an ACE inhibitor or angiotensin II receptor blocker.  The patient has not been previously hospitalized due to diabetic ketoacidosis.     Current symptoms/problems include none. His clinical course has been stable.       The patient is currently checking the blood glucose at least one time per day.    Patient is using: glucometer  Fasting 115-126mg/dL   Today 103mg/dL     Hypoglycemia frequency 60s    Current Medication Regimen  Tresiba 17 units SQ daily x 1 week; preivously 16 units   Glimepiride 2mg once daily  Ozempic 2mg SQ once weekly   Farxiga 10mg once daily      MEALS - eats when hungry   Beverages - pomegranate juice, sugar free brands, Zevia, decaf coffe, flavored packets      Exercise regimen - walking dogs [place] property   His maximum weight was 365 lbs       Review of Systems   Cardiovascular:  Negative for chest pain.   Musculoskeletal:  Positive for back pain.   Psychiatric/Behavioral:          Depression    All other systems reviewed and are negative.      Objective   /64   Pulse 74   Ht 1.778 m (5' 10\")   Wt 99.2 kg (218 lb 9.6 oz)   BMI 31.37 kg/m²   Physical Exam  Vitals and nursing note reviewed.   Constitutional:       General: He is not in acute distress.     Appearance: Normal appearance. He is obese.   HENT:      Head: Normocephalic and atraumatic.      Nose: Nose normal.      Mouth/Throat:      Mouth: Mucous membranes are moist. "   Eyes:      Extraocular Movements: Extraocular movements intact.   Cardiovascular:      Rate and Rhythm: Normal rate and regular rhythm.   Pulmonary:      Effort: Pulmonary effort is normal.      Breath sounds: Normal breath sounds.   Musculoskeletal:         General: Normal range of motion.   Skin:     General: Skin is warm.   Neurological:      Mental Status: He is alert and oriented to person, place, and time.   Psychiatric:         Mood and Affect: Mood normal.       Lab Review  Glucose (mg/dL)   Date Value   10/03/2024 75   09/12/2024 84   06/21/2024 221 (H)     POC HEMOGLOBIN A1c (%)   Date Value   02/13/2025 6.8 (A)   01/31/2024 7.9 (A)     Hemoglobin A1C (%)   Date Value   09/12/2024 7.1 (H)   10/09/2023 7.2 (H)   02/24/2023 7.1 (A)   08/24/2022 6.7 (A)   08/24/2022 6.7 (A)     Bicarbonate (mmol/L)   Date Value   10/03/2024 27   09/12/2024 30   06/21/2024 26     Urea Nitrogen (mg/dL)   Date Value   10/03/2024 35 (H)   09/12/2024 40 (H)   06/21/2024 48 (H)     Creatinine (mg/dL)   Date Value   10/03/2024 2.37 (H)   09/12/2024 2.40 (H)   06/21/2024 2.47 (H)     Lab Results   Component Value Date    CHOL 94 09/12/2024    CHOL 111 10/09/2023    CHOL 103 08/24/2022     Lab Results   Component Value Date    HDL 33.5 09/12/2024    HDL 40.7 10/09/2023    HDL 31.4 (A) 08/24/2022     Lab Results   Component Value Date    LDLCALC 46 09/12/2024    LDLCALC 54 (L) 10/09/2023     Lab Results   Component Value Date    TRIG 74 09/12/2024    TRIG 80 10/09/2023    TRIG 105 08/24/2022       Health Maintenance:   Foot Exam: January 24, 2023  Eye Exam: 2024  Urine Albumin: September 12, 2024    Assessment/Plan   74-year-old male presents for follow-up for type 2 diabetes.  He is noted to be on a statin. His blood pressure is at goal today.     Type 2 diabetes with nephropathy (Multi)  To continue Ozempic 2mg subcutaneous once weekly   To continue Glimepiride 2mg once daily  To continue Tresiba 17 units SQ at bedtime  To continue  Farxiga 10mg once daily   To obtain blood tests before the next appointment   The A1C is at goal  Congratulations on the weight loss  Follow up in 6 months with glucose log

## 2025-02-12 NOTE — PATIENT INSTRUCTIONS
Thank you for choosing [facility]  for your health care needs.  If you have any questions, concerns or medical needs, please feel free to contact our office at [phone].    Please ensure you complete your blood work one week before the next scheduled appointment.    To continue Ozempic 2mg subcutaneous once weekly   To continue Glimepiride 2mg once daily  To continue Tresiba 17 units SQ at bedtime  To continue Farxiga 10mg once daily   To obtain blood tests before the next appointment   Follow up in 6 months with glucose log

## 2025-02-13 ENCOUNTER — APPOINTMENT (OUTPATIENT)
Dept: ENDOCRINOLOGY | Facility: CLINIC | Age: 75
End: 2025-02-13
Payer: MEDICARE

## 2025-02-13 VITALS
DIASTOLIC BLOOD PRESSURE: 64 MMHG | BODY MASS INDEX: 31.3 KG/M2 | HEIGHT: 70 IN | WEIGHT: 218.6 LBS | HEART RATE: 74 BPM | SYSTOLIC BLOOD PRESSURE: 130 MMHG

## 2025-02-13 DIAGNOSIS — E11.9 CONTROLLED TYPE 2 DIABETES MELLITUS WITHOUT COMPLICATION, WITH LONG-TERM CURRENT USE OF INSULIN (MULTI): ICD-10-CM

## 2025-02-13 DIAGNOSIS — E11.21 TYPE 2 DIABETES WITH NEPHROPATHY (MULTI): Primary | ICD-10-CM

## 2025-02-13 DIAGNOSIS — Z79.4 CONTROLLED TYPE 2 DIABETES MELLITUS WITHOUT COMPLICATION, WITH LONG-TERM CURRENT USE OF INSULIN (MULTI): ICD-10-CM

## 2025-02-13 LAB — POC HEMOGLOBIN A1C: 6.8 % (ref 4.2–6.5)

## 2025-02-13 PROCEDURE — 3075F SYST BP GE 130 - 139MM HG: CPT | Performed by: INTERNAL MEDICINE

## 2025-02-13 PROCEDURE — 83036 HEMOGLOBIN GLYCOSYLATED A1C: CPT | Performed by: INTERNAL MEDICINE

## 2025-02-13 PROCEDURE — 1160F RVW MEDS BY RX/DR IN RCRD: CPT | Performed by: INTERNAL MEDICINE

## 2025-02-13 PROCEDURE — 3008F BODY MASS INDEX DOCD: CPT | Performed by: INTERNAL MEDICINE

## 2025-02-13 PROCEDURE — 1159F MED LIST DOCD IN RCRD: CPT | Performed by: INTERNAL MEDICINE

## 2025-02-13 PROCEDURE — G2211 COMPLEX E/M VISIT ADD ON: HCPCS | Performed by: INTERNAL MEDICINE

## 2025-02-13 PROCEDURE — 99213 OFFICE O/P EST LOW 20 MIN: CPT | Performed by: INTERNAL MEDICINE

## 2025-02-13 PROCEDURE — 3078F DIAST BP <80 MM HG: CPT | Performed by: INTERNAL MEDICINE

## 2025-02-13 ASSESSMENT — ENCOUNTER SYMPTOMS: BACK PAIN: 1

## 2025-02-14 DIAGNOSIS — I25.10 ATHEROSCLEROSIS OF NATIVE CORONARY ARTERY OF NATIVE HEART, UNSPECIFIED WHETHER ANGINA PRESENT: ICD-10-CM

## 2025-02-14 DIAGNOSIS — I48.0 PAROXYSMAL ATRIAL FIBRILLATION (MULTI): ICD-10-CM

## 2025-02-14 DIAGNOSIS — I10 BENIGN ESSENTIAL HYPERTENSION: ICD-10-CM

## 2025-02-14 RX ORDER — DAPAGLIFLOZIN 10 MG/1
10 TABLET, FILM COATED ORAL DAILY
Qty: 90 TABLET | Refills: 1 | Status: SHIPPED | OUTPATIENT
Start: 2025-02-14 | End: 2025-08-13

## 2025-02-14 RX ORDER — INSULIN DEGLUDEC 100 U/ML
17 INJECTION, SOLUTION SUBCUTANEOUS NIGHTLY
Qty: 5 EACH | Refills: 5 | Status: SHIPPED | OUTPATIENT
Start: 2025-02-14 | End: 2025-08-13

## 2025-02-14 RX ORDER — GLIMEPIRIDE 2 MG/1
2 TABLET ORAL
Qty: 90 TABLET | Refills: 1 | Status: SHIPPED | OUTPATIENT
Start: 2025-02-14 | End: 2025-08-13

## 2025-02-14 RX ORDER — SEMAGLUTIDE 2.68 MG/ML
2 INJECTION, SOLUTION SUBCUTANEOUS WEEKLY
Qty: 3 ML | Refills: 5 | Status: SHIPPED | OUTPATIENT
Start: 2025-02-14 | End: 2025-08-13

## 2025-02-14 NOTE — ASSESSMENT & PLAN NOTE
To continue Ozempic 2mg subcutaneous once weekly   To continue Glimepiride 2mg once daily  To continue Tresiba 17 units SQ at bedtime  To continue Farxiga 10mg once daily   To obtain blood tests before the next appointment   The A1C is at goal  Congratulations on the weight loss  Follow up in 6 months with glucose log       Yes

## 2025-02-18 RX ORDER — ROSUVASTATIN CALCIUM 40 MG/1
40 TABLET, COATED ORAL DAILY
Qty: 90 TABLET | Refills: 0 | Status: SHIPPED | OUTPATIENT
Start: 2025-02-18

## 2025-02-18 RX ORDER — APIXABAN 5 MG/1
5 TABLET, FILM COATED ORAL 2 TIMES DAILY
Qty: 180 TABLET | Refills: 0 | Status: SHIPPED | OUTPATIENT
Start: 2025-02-18

## 2025-02-18 RX ORDER — AMLODIPINE BESYLATE 5 MG/1
5 TABLET ORAL DAILY
Qty: 90 TABLET | Refills: 0 | Status: SHIPPED | OUTPATIENT
Start: 2025-02-18

## 2025-03-04 ENCOUNTER — APPOINTMENT (OUTPATIENT)
Dept: CARDIOLOGY | Facility: CLINIC | Age: 75
End: 2025-03-04
Payer: MEDICARE

## 2025-03-04 VITALS
WEIGHT: 210 LBS | SYSTOLIC BLOOD PRESSURE: 108 MMHG | BODY MASS INDEX: 30.06 KG/M2 | HEART RATE: 68 BPM | DIASTOLIC BLOOD PRESSURE: 80 MMHG | HEIGHT: 70 IN

## 2025-03-04 DIAGNOSIS — I50.32 CHRONIC DIASTOLIC HEART FAILURE, NYHA CLASS 2: ICD-10-CM

## 2025-03-04 DIAGNOSIS — I48.0 PAROXYSMAL ATRIAL FIBRILLATION (MULTI): ICD-10-CM

## 2025-03-04 DIAGNOSIS — I50.32 CHRONIC DIASTOLIC HEART FAILURE: ICD-10-CM

## 2025-03-04 DIAGNOSIS — I10 BENIGN ESSENTIAL HYPERTENSION: ICD-10-CM

## 2025-03-04 DIAGNOSIS — I25.10 ATHEROSCLEROSIS OF NATIVE CORONARY ARTERY OF NATIVE HEART WITHOUT ANGINA PECTORIS: Primary | ICD-10-CM

## 2025-03-04 DIAGNOSIS — I48.11 LONGSTANDING PERSISTENT ATRIAL FIBRILLATION (MULTI): ICD-10-CM

## 2025-03-04 PROCEDURE — 93005 ELECTROCARDIOGRAM TRACING: CPT | Performed by: INTERNAL MEDICINE

## 2025-03-04 PROCEDURE — 3008F BODY MASS INDEX DOCD: CPT | Performed by: INTERNAL MEDICINE

## 2025-03-04 PROCEDURE — 99214 OFFICE O/P EST MOD 30 MIN: CPT | Performed by: INTERNAL MEDICINE

## 2025-03-04 PROCEDURE — 1160F RVW MEDS BY RX/DR IN RCRD: CPT | Performed by: INTERNAL MEDICINE

## 2025-03-04 PROCEDURE — 3079F DIAST BP 80-89 MM HG: CPT | Performed by: INTERNAL MEDICINE

## 2025-03-04 PROCEDURE — 99214 OFFICE O/P EST MOD 30 MIN: CPT | Mod: 25 | Performed by: INTERNAL MEDICINE

## 2025-03-04 PROCEDURE — 3074F SYST BP LT 130 MM HG: CPT | Performed by: INTERNAL MEDICINE

## 2025-03-04 PROCEDURE — 1159F MED LIST DOCD IN RCRD: CPT | Performed by: INTERNAL MEDICINE

## 2025-03-04 ASSESSMENT — ENCOUNTER SYMPTOMS
DEPRESSION: 1
OCCASIONAL FEELINGS OF UNSTEADINESS: 1
LOSS OF SENSATION IN FEET: 0

## 2025-03-04 NOTE — PROGRESS NOTES
"Chief Complaint:   Atrial Fibrillation, Coronary Artery Disease, Hypertension, and Heart Failure     History Of Present Illness:    Waldemar Trujillo is a 74 y.o. male presenting for annual follow-up.    Has a past medical history of chronic diastolic heart failure, coronary artery disease status post bypass done in 2000, paroxysmal atrial fibrillation, which is permanent since 2023, chronic kidney disease, hypertension, dyslipidemia, diabetes mellitus, and obesity.     The patient reports that he has been feeling reasonably well from the cardiac perspective. He does not have chest pain or shortness of breath with activities. He states that his heart failure has been managed well with a low-dose diuretics every other day for years.         Has been losing weight steadily, almost 37 pounds lower than what he was last year and also recently started on Ozempic by PCP for diabetes.      Last Recorded Vitals:  Vitals:    03/04/25 1258   BP: 108/80   BP Location: Left arm   Pulse: 68   Weight: 95.3 kg (210 lb)   Height: 1.778 m (5' 10\")       Past Medical History:  He has a past medical history of Activated protein C resistance (Multi), Acute diastolic (congestive) heart failure (10/29/2018), Atherosclerosis of native coronary artery of native heart without angina pectoris (04/05/2023), Benign essential hypertension (04/05/2023), Benign prostatic hyperplasia with lower urinary tract symptoms (11/15/2019), Chronic diastolic heart failure, NYHA class 2 (04/05/2023), Chronic kidney disease (CKD) stage G3b/A1, moderately decreased glomerular filtration rate (GFR) between 30-44 mL/min/1.73 square meter and albuminuria creatinine ratio less than 30 mg/g (CMS* (Multi) (04/05/2023), Diabetes type 2, controlled (Multi) (04/05/2023), History of depression (04/05/2023), Hyperlipemia (04/05/2023), Morbid obesity (Multi) (04/05/2023), Obesity (BMI 30-39.9) (04/05/2023), KENNEY treated with BiPAP (04/05/2023), Paroxysmal atrial fibrillation " (Multi) (04/05/2023), Personal history of Methicillin resistant Staphylococcus aureus infection, Personal history of other diseases of male genital organs (11/15/2019), Personal history of other diseases of the musculoskeletal system and connective tissue (11/06/2019), Personal history of other diseases of the musculoskeletal system and connective tissue, Personal history of other diseases of the musculoskeletal system and connective tissue, Personal history of other diseases of the nervous system and sense organs, Personal history of other diseases of the nervous system and sense organs, Personal history of other diseases of the respiratory system (11/15/2019), Personal history of other endocrine, nutritional and metabolic disease (11/15/2019), Personal history of other endocrine, nutritional and metabolic disease (11/15/2019), Personal history of urinary calculi, Right ventricular systolic dysfunction (04/05/2023), Type 2 diabetes mellitus without complications (Multi) (12/02/2019), Type 2 diabetes with nephropathy (Multi) (04/05/2023), and Venous stasis of lower extremity (04/05/2023).    Past Surgical History:  He has a past surgical history that includes Total hip arthroplasty (10/06/2016); Coronary artery bypass graft (10/06/2016); Cataract extraction (08/28/2017); Other surgical history (10/29/2018); Other surgical history (11/09/2019); Other surgical history (11/09/2019); Other surgical history (11/09/2019); and Hutchins tooth extraction.      Social History:  He reports that he has quit smoking. His smoking use included cigarettes. He does not have any smokeless tobacco history on file. He reports that he does not currently use alcohol. He reports current drug use. Frequency: 1.00 time per week. Drug: Marijuana.    Family History:  Family History   Problem Relation Name Age of Onset    Coronary artery disease Mother      Diabetes Mother      Coronary artery disease Father      Diabetes Father         "  Allergies:  Ace inhibitors and Atorvastatin    Outpatient Medications:  Current Outpatient Medications   Medication Instructions    alpha lipoic acid 800 mg, 2 times daily    amLODIPine (NORVASC) 5 mg, oral, Daily    BD Ultra-Fine Mini Pen Needle 31 gauge x 3/16\" needle USE AS DIRECTED UNDER THE SKIN ONCE DAILY AT BEDTIME    coenzyme Q-10 200 mg capsule Take by mouth.    Eliquis 5 mg, oral, 2 times daily    ezetimibe (ZETIA) 10 mg, oral, Daily    famotidine (Pepcid) 20 mg tablet 1 tablet, Every 12 hours    Farxiga 10 mg, oral, Daily    ferrous gluconate 236 mg (27 mg iron) tablet 1 tablet, 2 times weekly    glimepiride (AMARYL) 2 mg, oral, Daily before breakfast    insulin degludec (TRESIBA FLEXTOUCH) 17 Units, subcutaneous, Nightly    Kerendia 20 mg    medical cannabis 1 each    miscellaneous medical supply (Blood Pressure Cuff) misc Daily home blood pressure monitoring    nitroglycerin (NITROSTAT) 0.4 mg, Every 5 min PRN    omega-3 acid ethyl esters (LOVAZA) 1 g, oral, 2 times daily    Ozempic 2 mg, subcutaneous, Weekly    rosuvastatin (CRESTOR) 40 mg, oral, Daily    sodium bicarbonate 650 mg    torsemide (DEMADEX) 10 mg, Daily       Physical Exam:  Physical Exam  Vitals reviewed.   Constitutional:       Appearance: Normal appearance.   Neck:      Vascular: No carotid bruit or JVD.   Cardiovascular:      Rate and Rhythm: Normal rate and regular rhythm.      Heart sounds: Normal heart sounds, S1 normal and S2 normal. No murmur heard.  Pulmonary:      Effort: Pulmonary effort is normal.      Breath sounds: Normal breath sounds.   Abdominal:      General: Abdomen is flat. Bowel sounds are normal.      Palpations: Abdomen is soft.   Musculoskeletal:      Right lower leg: No edema.      Left lower leg: No edema.   Skin:     General: Skin is warm.   Neurological:      Mental Status: He is alert. Mental status is at baseline.   Psychiatric:         Mood and Affect: Mood normal.         Behavior: Behavior normal.      "      Last Labs:  CBC -  Lab Results   Component Value Date    WBC 5.0 11/13/2023    HGB 14.7 11/13/2023    HCT 44.4 11/13/2023    MCV 94 11/13/2023     11/13/2023       CMP -  Lab Results   Component Value Date    CALCIUM 9.6 10/03/2024    PHOS 4.4 10/03/2024    PROT 6.8 09/12/2024    ALBUMIN 4.4 10/03/2024    AST 26 09/12/2024    ALT 32 09/12/2024    ALKPHOS 80 09/12/2024    BILITOT 0.5 09/12/2024       LIPID PANEL -   Lab Results   Component Value Date    CHOL 94 09/12/2024    TRIG 74 09/12/2024    HDL 33.5 09/12/2024    CHHDL 2.8 09/12/2024    LDLF 51 08/24/2022    VLDL 15 09/12/2024    NHDL 61 09/12/2024       RENAL FUNCTION PANEL -   Lab Results   Component Value Date    GLUCOSE 75 10/03/2024     10/03/2024    K 4.2 10/03/2024     10/03/2024    CO2 27 10/03/2024    ANIONGAP 13 10/03/2024    BUN 35 (H) 10/03/2024    CREATININE 2.37 (H) 10/03/2024    GFRMALE 37 (A) 06/01/2023    CALCIUM 9.6 10/03/2024    PHOS 4.4 10/03/2024    ALBUMIN 4.4 10/03/2024        Lab Results   Component Value Date    BNP 86 08/24/2022    HGBA1C 6.8 (A) 02/13/2025       Last Cardiology Tests:  ECG:  ECG 12 Lead 02/27/2024      Echo:  Transthoracic Echo Limited 03/14/2024      Ejection Fractions:  EF   Date/Time Value Ref Range Status   03/14/2024 02:24 PM 59 %        Cath:  No results found for this or any previous visit from the past 1095 days.      Stress Test:  No results found for this or any previous visit from the past 1095 days.      Cardiac Imaging:  No results found for this or any previous visit from the past 1095 days.          Assessment/Plan     1. Coronary artery disease  The patient has a history of coronary artery disease status post bypass done in 2000.  This appears stable as he denies any anginal chest discomfort.  Nuclear stress done in August 2021 showed a small reversible perfusion defect in the anterolateral wall suggesting an area of ischemia, intermediate probability of ischemia, calculated  ejection fraction 74%. It is being medically in the absence of symptoms and in presence of significant renal dysfunction.  Echocardiogram done in November 2018 showed normal left ventricular systolic function with an ejection fraction 55%, grade 2 diastolic dysfunction, dilated right ventricle with moderately reduced right ventricular systolic function, no significant valve abnormalities.  Would continue with current medical therapy. Lipid profile is favorable. Because he is on oral anticoagulants I advised him to stop taking the aspirin.            2. Chronic diastolic heart failure/right ventricular systolic dysfunction  Patient has a history of chronic diastolic heart failure and did follow once with the heart failure clinic. He has not been there in a while. Symptoms have been well managed with a low-dose of diuretics every other day and he has not had any heart failure exacerbations for a while. He is NYHA class II.  Echocardiogram done in November 2018 and 2023 showed normal left ventricular systolic function with an ejection fraction 55%, grade 2 diastolic dysfunction, dilated right ventricle with moderately reduced right ventricular systolic function, no significant valve abnormalities.  The patient should continue his current heart failure medications.  He is also on Farxiga but unlikely to tolerate Aldactone due to abnormal kidney function.  Low-sodium diet.     3.  Permanent atrial fibrillation   He seems to be asymptomatic.  LV function is unchanged we continued with rate control.  He is chronically anticoagulated.     4. CKD  Stable, recent blood work as noted in HPI.     5. Hypertension  Blood pressure currently well-controlled we will continue current regimen.     Genetically did not tolerate CPAP or did not get it.  Now losing weight steadily likely sleep apnea will improve.    6. Dyslipidemia  Favorable continue current management.    7.  BMI of 30-losing weight steadily prior to starting Ozempic  which will also help coronary artery disease.    Theresa Nassar MD

## 2025-03-06 DIAGNOSIS — E11.21 TYPE 2 DIABETES WITH NEPHROPATHY (MULTI): Primary | ICD-10-CM

## 2025-03-07 LAB
Q ONSET: 211 MS
QRS COUNT: 11 BEATS
QRS DURATION: 90 MS
QT INTERVAL: 416 MS
QTC CALCULATION(BAZETT): 442 MS
QTC FREDERICIA: 434 MS
R AXIS: 43 DEGREES
T AXIS: -26 DEGREES
T OFFSET: 419 MS
VENTRICULAR RATE: 68 BPM

## 2025-03-07 RX ORDER — PEN NEEDLE, DIABETIC 31 GX5/16"
NEEDLE, DISPOSABLE MISCELLANEOUS
Refills: 0 | OUTPATIENT
Start: 2025-03-07

## 2025-03-07 RX ORDER — PEN NEEDLE, DIABETIC 31 GX5/16"
1 NEEDLE, DISPOSABLE MISCELLANEOUS NIGHTLY
Qty: 100 EACH | Refills: 3 | Status: SHIPPED | OUTPATIENT
Start: 2025-03-07 | End: 2026-03-07

## 2025-03-07 NOTE — TELEPHONE ENCOUNTER
8/18/2025      Patient wife called for bd pen needles to be sent to [facility]

## 2025-03-07 NOTE — TELEPHONE ENCOUNTER
Please have patient request refill through  [facility] or by phone [phone] last filled through  [facility]

## 2025-04-16 ENCOUNTER — TELEPHONE (OUTPATIENT)
Dept: CARDIOLOGY | Facility: HOSPITAL | Age: 75
End: 2025-04-16
Payer: MEDICARE

## 2025-04-16 NOTE — TELEPHONE ENCOUNTER
Called PT and left VM stating that he will need to be seen in office for a cardiac clearance for upcoming Coloscopy on 5/29.   Instructed PT to give our office a call back

## 2025-04-25 PROBLEM — I50.32 CHRONIC DIASTOLIC CHF (CONGESTIVE HEART FAILURE): Status: ACTIVE | Noted: 2025-04-25

## 2025-04-25 PROBLEM — E11.21 TYPE 2 DIABETES MELLITUS WITH DIABETIC NEPHROPATHY: Status: ACTIVE | Noted: 2025-04-25

## 2025-04-25 RX ORDER — LOSARTAN POTASSIUM 50 MG/1
1 TABLET ORAL DAILY
COMMUNITY
End: 2025-05-08 | Stop reason: WASHOUT

## 2025-05-05 ENCOUNTER — TELEPHONE (OUTPATIENT)
Dept: CARDIOLOGY | Facility: HOSPITAL | Age: 75
End: 2025-05-05
Payer: MEDICARE

## 2025-05-05 NOTE — TELEPHONE ENCOUNTER
Patients spouse called office asking for a refill of the   Eliquis 5 mg tablet [[id number]]     amLODIPine (Norvasc) 5 mg tablet [[id number]]     rosuvastatin (Crestor) 40 mg tablet [[id number]]     ezetimibe (Zetia) 10 mg tablet [[id number]]     Kerendia 20 mg tablet [[id number]]   And asks for [facility] to [facility] AT Portal to Registered [facility] Sites   [facility] [place] [place] [address]

## 2025-05-05 NOTE — TELEPHONE ENCOUNTER
Called patient about refills.  Cardiology does not manage Kerendia.  He is seeing Derick Gonzalez on Thursday 5/8.  He has enough medication to last a couple more weeks.  Medications pended for visit.

## 2025-05-08 ENCOUNTER — OFFICE VISIT (OUTPATIENT)
Dept: CARDIOLOGY | Facility: HOSPITAL | Age: 75
End: 2025-05-08
Payer: MEDICARE

## 2025-05-08 ENCOUNTER — APPOINTMENT (OUTPATIENT)
Dept: CARDIOLOGY | Facility: HOSPITAL | Age: 75
End: 2025-05-08
Payer: MEDICARE

## 2025-05-08 VITALS
DIASTOLIC BLOOD PRESSURE: 72 MMHG | SYSTOLIC BLOOD PRESSURE: 136 MMHG | WEIGHT: 213 LBS | OXYGEN SATURATION: 100 % | BODY MASS INDEX: 30.49 KG/M2 | HEIGHT: 70 IN | HEART RATE: 74 BPM

## 2025-05-08 DIAGNOSIS — I10 BENIGN ESSENTIAL HYPERTENSION: ICD-10-CM

## 2025-05-08 DIAGNOSIS — Z01.818 PRE-OP EXAM: Primary | ICD-10-CM

## 2025-05-08 DIAGNOSIS — I25.10 ATHEROSCLEROSIS OF NATIVE CORONARY ARTERY OF NATIVE HEART WITHOUT ANGINA PECTORIS: ICD-10-CM

## 2025-05-08 DIAGNOSIS — I48.0 PAROXYSMAL ATRIAL FIBRILLATION (MULTI): ICD-10-CM

## 2025-05-08 DIAGNOSIS — Z01.810 PREOPERATIVE CARDIOVASCULAR EXAMINATION: ICD-10-CM

## 2025-05-08 DIAGNOSIS — E78.5 HYPERLIPIDEMIA, UNSPECIFIED HYPERLIPIDEMIA TYPE: ICD-10-CM

## 2025-05-08 DIAGNOSIS — I25.10 ATHEROSCLEROSIS OF NATIVE CORONARY ARTERY OF NATIVE HEART, UNSPECIFIED WHETHER ANGINA PRESENT: ICD-10-CM

## 2025-05-08 PROCEDURE — 3008F BODY MASS INDEX DOCD: CPT | Performed by: PHYSICIAN ASSISTANT

## 2025-05-08 PROCEDURE — 99213 OFFICE O/P EST LOW 20 MIN: CPT | Performed by: PHYSICIAN ASSISTANT

## 2025-05-08 PROCEDURE — 1159F MED LIST DOCD IN RCRD: CPT | Performed by: PHYSICIAN ASSISTANT

## 2025-05-08 PROCEDURE — 3044F HG A1C LEVEL LT 7.0%: CPT | Performed by: PHYSICIAN ASSISTANT

## 2025-05-08 PROCEDURE — 3078F DIAST BP <80 MM HG: CPT | Performed by: PHYSICIAN ASSISTANT

## 2025-05-08 PROCEDURE — 3075F SYST BP GE 130 - 139MM HG: CPT | Performed by: PHYSICIAN ASSISTANT

## 2025-05-08 PROCEDURE — 93010 ELECTROCARDIOGRAM REPORT: CPT | Performed by: INTERNAL MEDICINE

## 2025-05-08 PROCEDURE — 93005 ELECTROCARDIOGRAM TRACING: CPT | Performed by: PHYSICIAN ASSISTANT

## 2025-05-08 RX ORDER — EZETIMIBE 10 MG/1
10 TABLET ORAL DAILY
Qty: 90 TABLET | Refills: 3 | Status: SHIPPED | OUTPATIENT
Start: 2025-05-08 | End: 2026-05-03

## 2025-05-08 RX ORDER — ROSUVASTATIN CALCIUM 40 MG/1
40 TABLET, COATED ORAL DAILY
Qty: 90 TABLET | Refills: 3 | Status: SHIPPED | OUTPATIENT
Start: 2025-05-08

## 2025-05-08 RX ORDER — AMLODIPINE BESYLATE 5 MG/1
5 TABLET ORAL DAILY
Qty: 90 TABLET | Refills: 3 | Status: SHIPPED | OUTPATIENT
Start: 2025-05-08

## 2025-05-08 ASSESSMENT — ENCOUNTER SYMPTOMS
SHORTNESS OF BREATH: 0
ABDOMINAL PAIN: 0
DYSURIA: 0
WEAKNESS: 0
NAUSEA: 0
PALPITATIONS: 0
FEVER: 0
WHEEZING: 0
DIARRHEA: 0
ORTHOPNEA: 0
VOMITING: 0

## 2025-05-08 NOTE — PATIENT INSTRUCTIONS
On the day of your procedure/colonoscopy on May 29 your last dose of Eliquis with the evening dose on 5/26/2025.  You will resume Eliquis as long as there was no bleeding complication the evening of the 29th.  Also on the morning of your procedure do not take your torsemide so you do not have to urinate but it will be okay to take your amlodipine with just a few sips of water to make sure that your blood pressure is controlled through the procedure.  Please keep your upcoming follow-ups with Marco A in September and then Dr. Nassar again next March.

## 2025-05-08 NOTE — PROGRESS NOTES
"Cardiology Follow Up  Chief Complaint:   Pre-op Clearance     History Of Present Illness:    Waldemar Trujillo is a 74 y.o. male presenting for annual follow-up.     Has a past medical history of chronic diastolic heart failure, coronary artery disease status post bypass done in 2000, paroxysmal atrial fibrillation, which is permanent since 2023, chronic kidney disease, hypertension, dyslipidemia, diabetes mellitus, and obesity.      The patient reports that he has been feeling reasonably well from the cardiac perspective. He does not have chest pain or shortness of breath with activities. He states that his heart failure has been managed well with a low-dose diuretics every other day for years.         Has been losing weight steadily, almost 37 pounds lower than what he was last year and also recently started on Ozempic by PCP for diabetes.   5-8-25: 74-year-old patient known to Dr. Nassar.  He presents for preoperative cardiovascular risk assessment.  Needs to have colonoscopy which is a routine procedure.  No cardiac symptoms he will need to come off of his Eliquis prior to the procedure as he has chronic atrial fibrillation excellent control of heart rates.  No awareness of the atrial fibrillation.  Again no chest pain shortness of breath or concerning cardiac symptoms.  His RCRI score is only 1 based on his history of chronic diastolic heart failure.  He is considered low risk for low risk procedure and it would be okay for him to stop the Eliquis.  Patient verbalized understanding that there is slight risk of stroke while off anticoagulants in the setting of atrial fibrillation.     Last Recorded Vitals:  Vitals:    05/08/25 1234   BP: 136/72   BP Location: Left arm   Patient Position: Sitting   BP Cuff Size: Large adult   Pulse: 74   SpO2: 100%   Weight: 96.6 kg (213 lb)   Height: 1.778 m (5' 10\")       Past Medical History:  He has a past medical history of Activated protein C resistance (Multi), Acute " diastolic (congestive) heart failure (10/29/2018), Atherosclerosis of native coronary artery of native heart without angina pectoris (04/05/2023), Benign essential hypertension (04/05/2023), Benign prostatic hyperplasia with lower urinary tract symptoms (11/15/2019), Chronic diastolic heart failure, NYHA class 2 (04/05/2023), Chronic kidney disease (CKD) stage G3b/A1, moderately decreased glomerular filtration rate (GFR) between 30-44 mL/min/1.73 square meter and albuminuria creatinine ratio less than 30 mg/g (CMS* (Multi) (04/05/2023), Diabetes type 2, controlled (Multi) (04/05/2023), History of depression (04/05/2023), Hyperlipemia (04/05/2023), Morbid obesity (Multi) (04/05/2023), Obesity (BMI 30-39.9) (04/05/2023), KENNEY treated with BiPAP (04/05/2023), Paroxysmal atrial fibrillation (Multi) (04/05/2023), Personal history of Methicillin resistant Staphylococcus aureus infection, Personal history of other diseases of male genital organs (11/15/2019), Personal history of other diseases of the musculoskeletal system and connective tissue (11/06/2019), Personal history of other diseases of the musculoskeletal system and connective tissue, Personal history of other diseases of the musculoskeletal system and connective tissue, Personal history of other diseases of the nervous system and sense organs, Personal history of other diseases of the nervous system and sense organs, Personal history of other diseases of the respiratory system (11/15/2019), Personal history of other endocrine, nutritional and metabolic disease (11/15/2019), Personal history of other endocrine, nutritional and metabolic disease (11/15/2019), Personal history of urinary calculi, Right ventricular systolic dysfunction (04/05/2023), Type 2 diabetes mellitus without complications (12/02/2019), Type 2 diabetes with nephropathy (Multi) (04/05/2023), and Venous stasis of lower extremity (04/05/2023).    Past Surgical History:  He has a past surgical  "history that includes Total hip arthroplasty (10/06/2016); Coronary artery bypass graft (10/06/2016); Cataract extraction (08/28/2017); Other surgical history (10/29/2018); Other surgical history (11/09/2019); Other surgical history (11/09/2019); Other surgical history (11/09/2019); and [place] tooth extraction.      Social History:  He reports that he has quit smoking. His smoking use included cigarettes. He does not have any smokeless tobacco history on file. He reports that he does not currently use alcohol. He reports current drug use. Frequency: 1.00 time per week. Drug: Marijuana.    Family History:  Family History[1]     Allergies:  Ace inhibitors and Atorvastatin    Outpatient Medications:  Current Outpatient Medications   Medication Instructions    alpha lipoic acid 800 mg, 2 times daily    amLODIPine (NORVASC) 5 mg, oral, Daily    apixaban (ELIQUIS) 5 mg, oral, 2 times daily    BD Ultra-Fine Mini Pen Needle 31 gauge x 3/16\" needle 1 each, subcutaneous, Nightly    coenzyme Q-10 200 mg capsule Take by mouth.    ezetimibe (ZETIA) 10 mg, oral, Daily    famotidine (Pepcid) 20 mg tablet 1 tablet, Every 12 hours    Farxiga 10 mg, oral, Daily    ferrous gluconate 236 mg (27 mg iron) tablet 1 tablet, 2 times weekly    insulin degludec (TRESIBA FLEXTOUCH) 17 Units, subcutaneous, Nightly    Kerendia 20 mg    medical cannabis 1 each    miscellaneous medical supply (Blood Pressure Cuff) misc Daily home blood pressure monitoring    nitroglycerin (NITROSTAT) 0.4 mg, Every 5 min PRN    omega-3 acid ethyl esters (LOVAZA) 1 g, oral, 2 times daily    Ozempic 2 mg, subcutaneous, Weekly    rosuvastatin (CRESTOR) 40 mg, oral, Daily    sodium bicarbonate 650 mg    torsemide (DEMADEX) 10 mg, Daily     Review of Systems   Constitutional: Negative for fever and malaise/fatigue.   Cardiovascular:  Negative for chest pain, orthopnea and palpitations.   Respiratory:  Negative for shortness of breath and wheezing.    Skin:  Negative for " itching and rash.   Gastrointestinal:  Negative for abdominal pain, diarrhea, nausea and vomiting.   Genitourinary:  Negative for dysuria.   Neurological:  Negative for weakness.      Physical Exam  Constitutional:       General: He is not in acute distress.  HENT:      Mouth/Throat:      Mouth: Mucous membranes are moist.   Neck:      Comments: Flat neck veins  Cardiovascular:      Rate and Rhythm: Normal rate. Rhythm irregular.      Heart sounds: Normal heart sounds. No murmur heard.  Pulmonary:      Effort: Pulmonary effort is normal.      Breath sounds: Normal breath sounds.   Abdominal:      General: Abdomen is flat. Bowel sounds are normal.      Palpations: Abdomen is soft.   Musculoskeletal:         General: No swelling.   Skin:     General: Skin is warm and dry.   Neurological:      Mental Status: He is alert and oriented to person, place, and time.   Psychiatric:         Mood and Affect: Mood normal.           Last Labs:  CBC -  Lab Results   Component Value Date    WBC 5.0 11/13/2023    HGB 14.7 11/13/2023    HCT 44.4 11/13/2023    MCV 94 11/13/2023     11/13/2023       CMP -  Lab Results   Component Value Date    CALCIUM 9.6 10/03/2024    PHOS 4.4 10/03/2024    PROT 6.8 09/12/2024    ALBUMIN 4.4 10/03/2024    AST 26 09/12/2024    ALT 32 09/12/2024    ALKPHOS 80 09/12/2024    BILITOT 0.5 09/12/2024       LIPID PANEL -   Lab Results   Component Value Date    CHOL 94 09/12/2024    TRIG 74 09/12/2024    HDL 33.5 09/12/2024    CHHDL 2.8 09/12/2024    LDLF 51 08/24/2022    VLDL 15 09/12/2024    NHDL 61 09/12/2024       RENAL FUNCTION PANEL -   Lab Results   Component Value Date    GLUCOSE 75 10/03/2024     10/03/2024    K 4.2 10/03/2024     10/03/2024    CO2 27 10/03/2024    ANIONGAP 13 10/03/2024    BUN 35 (H) 10/03/2024    CREATININE 2.37 (H) 10/03/2024    GFRMALE 37 (A) 06/01/2023    CALCIUM 9.6 10/03/2024    PHOS 4.4 10/03/2024    ALBUMIN 4.4 10/03/2024        Lab Results   Component  Value Date    BNP 86 08/24/2022    HGBA1C 6.8 (A) 02/13/2025       Last Cardiology Tests:    Echo:  Transthoracic Echo Limited 03/14/2024--CONCLUSIONS:   1. Left ventricular systolic function is normal with a 55-60% estimated ejection fraction.    Ejection Fractions:  EF   Date/Time Value Ref Range Status   03/14/2024 02:24 PM 59 %        Stress Test: 2022--  IMPRESSION:  1. There is a small reversible perfusion defect in the anterolateral  wall suggesting an area of ischemia. There is an intermediate  probability of ischemia.  2. Calculated ejection fraction is 74% without segmental wall motion  abnormalities seen.   No results found for this or any previous visit from the past 1095 days.        Lab review: I have personally reviewed the laboratory result(s)     Assessment/Plan   Problem List Items Addressed This Visit           ICD-10-CM       Cardiac and Vasculature    Atherosclerosis of coronary artery I25.10    Relevant Medications    amLODIPine (Norvasc) 5 mg tablet    rosuvastatin (Crestor) 40 mg tablet    Benign essential hypertension I10    Relevant Medications    amLODIPine (Norvasc) 5 mg tablet    Hyperlipidemia E78.5    Relevant Medications    ezetimibe (Zetia) 10 mg tablet    Atherosclerosis of native coronary artery of native heart without angina pectoris I25.10    Relevant Medications    amLODIPine (Norvasc) 5 mg tablet    Preoperative cardiovascular examination Z01.810     Other Visit Diagnoses         Codes      Pre-op exam    -  Primary Z01.818    Relevant Orders    ECG 12 lead (Clinic Performed)      Paroxysmal atrial fibrillation (Multi)     I48.0    Relevant Medications    amLODIPine (Norvasc) 5 mg tablet    apixaban (Eliquis) 5 mg tablet        Preoperative cardiovascular risk assessment--again for colonoscopy patient considered low risk.  No contraindication to proceed.  No concerning cardiac symptoms.  Last dose of Eliquis will be the evening of 5/26/2025 and he will be able to resume it the  evening of the procedure assuming no complications with the procedure.  Patient verbalized understanding of being off Eliquis in the setting of his atrial fibrillation.  Chronic atrial fibrillation--ventricular rates well-controlled.  Not on any rate slowing meds.  On the day of his procedure he should take his amlodipine.  History of CAD--remote bypass many years ago.  Has been feeling very well and over the last several years is lost about 150 pounds.  Physiologically doing very well.  Hypertension--blood pressure is controlled and again on the day of procedure will take his amlodipine.  Overall patient stable from cardiac standpoint and again considered low risk for a colonoscopy and he verbalized understanding about discontinuing his Eliquis for the procedure.  Patient has scheduled follow-up in this office in September and followed by Dr. Nassar next March.  Should the be any changes he is instructed to contact the office.      Liang Gonzalez PA-C  5/8/2025  12:38 PM       [1]   Family History  Problem Relation Name Age of Onset    Coronary artery disease Mother      Diabetes Mother      Coronary artery disease Father      Diabetes Father

## 2025-05-09 LAB
Q ONSET: 214 MS
QRS COUNT: 10 BEATS
QRS DURATION: 96 MS
QT INTERVAL: 436 MS
QTC CALCULATION(BAZETT): 442 MS
QTC FREDERICIA: 441 MS
R AXIS: 27 DEGREES
T AXIS: -35 DEGREES
T OFFSET: 432 MS
VENTRICULAR RATE: 62 BPM

## 2025-05-29 ENCOUNTER — HOSPITAL ENCOUNTER (OUTPATIENT)
Dept: GASTROENTEROLOGY | Facility: HOSPITAL | Age: 75
Discharge: HOME | End: 2025-05-29
Payer: MEDICARE

## 2025-05-29 DIAGNOSIS — Z12.11 SCREEN FOR COLON CANCER: ICD-10-CM

## 2025-05-29 DIAGNOSIS — Z86.0101 H/O ADENOMATOUS POLYP OF COLON: ICD-10-CM

## 2025-05-29 DIAGNOSIS — Z86.0102 PERSONAL HISTORY OF HYPERPLASTIC COLON POLYPS: ICD-10-CM

## 2025-05-29 NOTE — NURSING NOTE
Pt took ozempic on Friday, and farxiga this morning. I spoke with Anabel THORNTON. She is speaking to pt now. Pt case will be cancelled at this time. Instructed pt to call dr barnes office to reschedule.

## 2025-07-16 ENCOUNTER — ANESTHESIA EVENT (OUTPATIENT)
Dept: GASTROENTEROLOGY | Facility: HOSPITAL | Age: 75
End: 2025-07-16
Payer: MEDICARE

## 2025-07-17 ENCOUNTER — HOSPITAL ENCOUNTER (OUTPATIENT)
Dept: CARDIOLOGY | Facility: HOSPITAL | Age: 75
Discharge: HOME | End: 2025-07-17
Payer: MEDICARE

## 2025-07-17 ENCOUNTER — ANESTHESIA (OUTPATIENT)
Dept: GASTROENTEROLOGY | Facility: HOSPITAL | Age: 75
End: 2025-07-17
Payer: MEDICARE

## 2025-07-17 ENCOUNTER — HOSPITAL ENCOUNTER (OUTPATIENT)
Dept: GASTROENTEROLOGY | Facility: HOSPITAL | Age: 75
Discharge: HOME | End: 2025-07-17
Payer: MEDICARE

## 2025-07-17 VITALS
TEMPERATURE: 97.1 F | BODY MASS INDEX: 30.49 KG/M2 | DIASTOLIC BLOOD PRESSURE: 89 MMHG | WEIGHT: 213 LBS | SYSTOLIC BLOOD PRESSURE: 157 MMHG | HEART RATE: 64 BPM | RESPIRATION RATE: 14 BRPM | HEIGHT: 70 IN | OXYGEN SATURATION: 99 %

## 2025-07-17 DIAGNOSIS — Z86.0101 HISTORY OF ADENOMATOUS POLYP OF COLON: ICD-10-CM

## 2025-07-17 DIAGNOSIS — Z12.11 SCREEN FOR COLON CANCER: Primary | ICD-10-CM

## 2025-07-17 PROCEDURE — 3700000002 HC GENERAL ANESTHESIA TIME - EACH INCREMENTAL 1 MINUTE

## 2025-07-17 PROCEDURE — 2500000004 HC RX 250 GENERAL PHARMACY W/ HCPCS (ALT 636 FOR OP/ED)

## 2025-07-17 PROCEDURE — 93005 ELECTROCARDIOGRAM TRACING: CPT

## 2025-07-17 PROCEDURE — 7100000009 HC PHASE TWO TIME - INITIAL BASE CHARGE

## 2025-07-17 PROCEDURE — 45380 COLONOSCOPY AND BIOPSY: CPT | Performed by: SURGERY

## 2025-07-17 PROCEDURE — 2500000004 HC RX 250 GENERAL PHARMACY W/ HCPCS (ALT 636 FOR OP/ED): Performed by: SURGERY

## 2025-07-17 PROCEDURE — 7100000010 HC PHASE TWO TIME - EACH INCREMENTAL 1 MINUTE

## 2025-07-17 PROCEDURE — 3700000001 HC GENERAL ANESTHESIA TIME - INITIAL BASE CHARGE

## 2025-07-17 RX ORDER — PROPOFOL 10 MG/ML
INJECTION, EMULSION INTRAVENOUS AS NEEDED
Status: DISCONTINUED | OUTPATIENT
Start: 2025-07-17 | End: 2025-07-17

## 2025-07-17 RX ORDER — SODIUM CHLORIDE 9 MG/ML
20 INJECTION, SOLUTION INTRAVENOUS CONTINUOUS
Status: ACTIVE | OUTPATIENT
Start: 2025-07-17 | End: 2025-07-17

## 2025-07-17 RX ORDER — GLYCOPYRROLATE 0.2 MG/ML
INJECTION INTRAMUSCULAR; INTRAVENOUS AS NEEDED
Status: DISCONTINUED | OUTPATIENT
Start: 2025-07-17 | End: 2025-07-17

## 2025-07-17 RX ADMIN — GLYCOPYRROLATE 0.2 MG: 0.2 INJECTION INTRAMUSCULAR; INTRAVENOUS at 08:25

## 2025-07-17 RX ADMIN — SODIUM CHLORIDE 20 ML/HR: 0.9 INJECTION, SOLUTION INTRAVENOUS at 08:21

## 2025-07-17 RX ADMIN — PROPOFOL 200 MG: 10 INJECTION, EMULSION INTRAVENOUS at 08:25

## 2025-07-17 RX ADMIN — SODIUM CHLORIDE, POTASSIUM CHLORIDE, SODIUM LACTATE AND CALCIUM CHLORIDE: 600; 310; 30; 20 INJECTION, SOLUTION INTRAVENOUS at 08:20

## 2025-07-17 ASSESSMENT — PAIN - FUNCTIONAL ASSESSMENT
PAIN_FUNCTIONAL_ASSESSMENT: 0-10

## 2025-07-17 ASSESSMENT — PAIN SCALES - GENERAL
PAIN_LEVEL: 0
PAINLEVEL_OUTOF10: 0 - NO PAIN

## 2025-07-17 ASSESSMENT — COLUMBIA-SUICIDE SEVERITY RATING SCALE - C-SSRS
6. HAVE YOU EVER DONE ANYTHING, STARTED TO DO ANYTHING, OR PREPARED TO DO ANYTHING TO END YOUR LIFE?: NO
1. IN THE PAST MONTH, HAVE YOU WISHED YOU WERE DEAD OR WISHED YOU COULD GO TO SLEEP AND NOT WAKE UP?: NO
2. HAVE YOU ACTUALLY HAD ANY THOUGHTS OF KILLING YOURSELF?: NO

## 2025-07-17 NOTE — ANESTHESIA POSTPROCEDURE EVALUATION
Patient: Waldemar Trujillo    Procedure Summary       Date: 07/17/25 Room / Location: [facility]    Anesthesia Start: 0820 Anesthesia Stop: 0847    Procedure: COLONOSCOPY Diagnosis:       Screen for colon cancer      History of adenomatous polyp of colon      Screen for colon cancer    Scheduled Providers: Slim Escobedo MD Responsible Provider: JOSE Young    Anesthesia Type: MAC ASA Status: 3            Anesthesia Type: MAC    Vitals Value Taken Time   /74 07/17/25 09:00   Temp 36.2 °C (97.2 °F) 07/17/25 08:50   Pulse 63 07/17/25 09:00   Resp 16 07/17/25 09:00   SpO2 99 % 07/17/25 09:00       Anesthesia Post Evaluation    Patient location during evaluation: bedside  Patient participation: complete - patient participated  Level of consciousness: awake  Pain score: 0  Pain management: adequate  Airway patency: patent  Cardiovascular status: acceptable  Respiratory status: acceptable  Hydration status: acceptable  Postoperative Nausea and Vomiting: none        There were no known notable events for this encounter.

## 2025-07-17 NOTE — H&P
History Of Present Illness  Waldemar Trujillo is a 74 y.o. male presenting with hx polyps.     Past Medical History  He has a past medical history of Activated protein C resistance (Multi), Acute diastolic (congestive) heart failure (10/29/2018), Atherosclerosis of native coronary artery of native heart without angina pectoris (04/05/2023), Benign essential hypertension (04/05/2023), Benign prostatic hyperplasia with lower urinary tract symptoms (11/15/2019), Chronic diastolic heart failure, NYHA class 2 (04/05/2023), Chronic kidney disease (CKD) stage G3b/A1, moderately decreased glomerular filtration rate (GFR) between 30-44 mL/min/1.73 square meter and albuminuria creatinine ratio less than 30 mg/g (CMS* (Multi) (04/05/2023), Diabetes type 2, controlled (Multi) (04/05/2023), History of depression (04/05/2023), Hyperlipemia (04/05/2023), Morbid obesity (Multi) (04/05/2023), Obesity (BMI 30-39.9) (04/05/2023), KENNEY treated with BiPAP (04/05/2023), Paroxysmal atrial fibrillation (Multi) (04/05/2023), Personal history of Methicillin resistant Staphylococcus aureus infection, Personal history of other diseases of male genital organs (11/15/2019), Personal history of other diseases of the musculoskeletal system and connective tissue (11/06/2019), Personal history of other diseases of the musculoskeletal system and connective tissue, Personal history of other diseases of the musculoskeletal system and connective tissue, Personal history of other diseases of the nervous system and sense organs, Personal history of other diseases of the nervous system and sense organs, Personal history of other diseases of the respiratory system (11/15/2019), Personal history of other endocrine, nutritional and metabolic disease (11/15/2019), Personal history of other endocrine, nutritional and metabolic disease (11/15/2019), Personal history of urinary calculi, Right ventricular systolic dysfunction (04/05/2023), Type 2 diabetes mellitus  "without complications (12/02/2019), Type 2 diabetes with nephropathy (Multi) (04/05/2023), and Venous stasis of lower extremity (04/05/2023).    Surgical History  He has a past surgical history that includes Total hip arthroplasty (10/06/2016); Coronary artery bypass graft (10/06/2016); Cataract extraction (08/28/2017); Other surgical history (10/29/2018); Other surgical history (11/09/2019); Other surgical history (11/09/2019); Other surgical history (11/09/2019); and [place] tooth extraction.     Social History  He reports that he has quit smoking. His smoking use included cigarettes. He does not have any smokeless tobacco history on file. He reports that he does not currently use alcohol. He reports current drug use. Frequency: 1.00 time per week. Drug: Marijuana.    Family History  Family History[1]     Allergies  Ace inhibitors and Atorvastatin    Review of Systems   All other systems reviewed and are negative.       Physical Exam  Vitals reviewed.     Cardiovascular:      Rate and Rhythm: Normal rate and regular rhythm.   Pulmonary:      Breath sounds: Normal breath sounds.     Skin:     General: Skin is warm and dry.     Neurological:      Mental Status: He is alert.     Psychiatric:         Mood and Affect: Mood normal.          Last Recorded Vitals  Blood pressure 148/68, pulse 55, temperature 36.4 °C (97.6 °F), temperature source Temporal, resp. rate 16, height 1.778 m (5' 10\"), weight 96.6 kg (213 lb), SpO2 100%.    Relevant Results               Assessment/Plan   Assessment & Plan  Screen for colon cancer    History of adenomatous polyp of colon      For colo       I spent 15 minutes in the professional and overall care of this patient.      Slim Escobedo MD       [1]   Family History  Problem Relation Name Age of Onset    Coronary artery disease Mother      Diabetes Mother      Coronary artery disease Father      Diabetes Father       "

## 2025-07-17 NOTE — ANESTHESIA PREPROCEDURE EVALUATION
Patient: Waldemar Trujillo    Procedure Information       Date/Time: 07/17/25 0800    Scheduled providers: Slim Escobedo MD    Procedure: COLONOSCOPY    Location: [facility]            Relevant Problems   Cardiac   (+) Abnormal EKG   (+) Atherosclerosis of coronary artery   (+) Atherosclerosis of native coronary artery of native heart without angina pectoris   (+) Benign essential hypertension   (+) Hyperlipemia   (+) Hyperlipidemia   (+) Longstanding persistent atrial fibrillation (Multi)   (+) Mixed hyperlipidemia      GI   (+) GERD without esophagitis      /Renal   (+) Benign prostatic hyperplasia with lower urinary tract symptoms      Endocrine   (+) Diabetes type 2, controlled (Multi)   (+) Diabetic nephropathy (Multi)   (+) Morbid obesity (Multi)   (+) Type 2 diabetes mellitus with diabetic nephropathy   (+) Type 2 diabetes mellitus with hyperglycemia (Multi)   (+) Type 2 diabetes mellitus without complications   (+) Type 2 diabetes with nephropathy (Multi)      Hematology   (+) Activated protein C resistance (Multi)   (+) Heterozygous factor V Leiden mutation (Multi)       Clinical information reviewed:   Tobacco  Allergies  Meds   Med Hx  Surg Hx   Fam Hx  Soc Hx        NPO Detail:  No data recorded     Physical Exam    Airway  Mallampati: II  TM distance: >3 FB  Neck ROM: full     Cardiovascular   Rhythm: regular  Rate: normal     Dental    Pulmonary - normal exam   Abdominal            Anesthesia Plan    History of general anesthesia?: yes  History of complications of general anesthesia?: no    ASA 3     MAC     Anesthetic plan and risks discussed with patient.  Use of blood products discussed with patient who.

## 2025-07-22 LAB
LABORATORY COMMENT REPORT: NORMAL
PATH REPORT.FINAL DX SPEC: NORMAL
PATH REPORT.GROSS SPEC: NORMAL
PATH REPORT.MICROSCOPIC SPEC OTHER STN: NORMAL
PATH REPORT.RELEVANT HX SPEC: NORMAL
PATH REPORT.TOTAL CANCER: NORMAL

## 2025-08-13 DIAGNOSIS — E11.9 CONTROLLED TYPE 2 DIABETES MELLITUS WITHOUT COMPLICATION, WITH LONG-TERM CURRENT USE OF INSULIN: ICD-10-CM

## 2025-08-13 DIAGNOSIS — Z79.4 CONTROLLED TYPE 2 DIABETES MELLITUS WITHOUT COMPLICATION, WITH LONG-TERM CURRENT USE OF INSULIN: ICD-10-CM

## 2025-08-14 DIAGNOSIS — E11.21 TYPE 2 DIABETES WITH NEPHROPATHY (MULTI): ICD-10-CM

## 2025-08-14 LAB
ALBUMIN SERPL-MCNC: 4.2 G/DL (ref 3.6–5.1)
ALBUMIN/CREAT UR: 77 MG/G CREAT
ALP SERPL-CCNC: 82 U/L (ref 35–144)
ALT SERPL-CCNC: 30 U/L (ref 9–46)
ANION GAP SERPL CALCULATED.4IONS-SCNC: 12 MMOL/L (CALC) (ref 7–17)
AST SERPL-CCNC: 28 U/L (ref 10–35)
BILIRUB SERPL-MCNC: 0.8 MG/DL (ref 0.2–1.2)
BUN SERPL-MCNC: 33 MG/DL (ref 7–25)
CALCIUM SERPL-MCNC: 9.3 MG/DL (ref 8.6–10.3)
CHLORIDE SERPL-SCNC: 104 MMOL/L (ref 98–110)
CHOLEST SERPL-MCNC: 110 MG/DL
CHOLEST/HDLC SERPL: 3.1 (CALC)
CO2 SERPL-SCNC: 26 MMOL/L (ref 20–32)
CREAT SERPL-MCNC: 2.35 MG/DL (ref 0.7–1.28)
CREAT UR-MCNC: 31 MG/DL (ref 20–320)
EGFRCR SERPLBLD CKD-EPI 2021: 28 ML/MIN/1.73M2
EST. AVERAGE GLUCOSE BLD GHB EST-MCNC: 154 MG/DL
EST. AVERAGE GLUCOSE BLD GHB EST-SCNC: 8.5 MMOL/L
GLUCOSE SERPL-MCNC: 90 MG/DL (ref 65–99)
HBA1C MFR BLD: 7 %
HDLC SERPL-MCNC: 36 MG/DL
LDLC SERPL CALC-MCNC: 58 MG/DL (CALC)
MICROALBUMIN UR-MCNC: 2.4 MG/DL
NONHDLC SERPL-MCNC: 74 MG/DL (CALC)
POTASSIUM SERPL-SCNC: 4.1 MMOL/L (ref 3.5–5.3)
PROT SERPL-MCNC: 6.8 G/DL (ref 6.1–8.1)
SODIUM SERPL-SCNC: 142 MMOL/L (ref 135–146)
TRIGL SERPL-MCNC: 84 MG/DL

## 2025-08-14 RX ORDER — SEMAGLUTIDE 2.68 MG/ML
2 INJECTION, SOLUTION SUBCUTANEOUS WEEKLY
Qty: 3 ML | Refills: 5 | Status: SHIPPED | OUTPATIENT
Start: 2025-08-14 | End: 2026-02-10

## 2025-08-18 ENCOUNTER — APPOINTMENT (OUTPATIENT)
Dept: ENDOCRINOLOGY | Facility: CLINIC | Age: 75
End: 2025-08-18
Payer: MEDICARE

## 2025-08-18 VITALS
SYSTOLIC BLOOD PRESSURE: 102 MMHG | BODY MASS INDEX: 29.55 KG/M2 | HEIGHT: 70 IN | WEIGHT: 206.4 LBS | DIASTOLIC BLOOD PRESSURE: 64 MMHG | HEART RATE: 62 BPM

## 2025-08-18 DIAGNOSIS — E10.9 TYPE 1 DIABETES MELLITUS WITHOUT COMPLICATION: Primary | ICD-10-CM

## 2025-08-18 DIAGNOSIS — Z79.4 CONTROLLED TYPE 2 DIABETES MELLITUS WITHOUT COMPLICATION, WITH LONG-TERM CURRENT USE OF INSULIN: ICD-10-CM

## 2025-08-18 DIAGNOSIS — E11.9 CONTROLLED TYPE 2 DIABETES MELLITUS WITHOUT COMPLICATION, WITH LONG-TERM CURRENT USE OF INSULIN: ICD-10-CM

## 2025-08-18 DIAGNOSIS — E11.21 TYPE 2 DIABETES WITH NEPHROPATHY (MULTI): ICD-10-CM

## 2025-08-18 PROCEDURE — G2211 COMPLEX E/M VISIT ADD ON: HCPCS | Performed by: INTERNAL MEDICINE

## 2025-08-18 PROCEDURE — 3008F BODY MASS INDEX DOCD: CPT | Performed by: INTERNAL MEDICINE

## 2025-08-18 PROCEDURE — 3078F DIAST BP <80 MM HG: CPT | Performed by: INTERNAL MEDICINE

## 2025-08-18 PROCEDURE — 1160F RVW MEDS BY RX/DR IN RCRD: CPT | Performed by: INTERNAL MEDICINE

## 2025-08-18 PROCEDURE — 99214 OFFICE O/P EST MOD 30 MIN: CPT | Performed by: INTERNAL MEDICINE

## 2025-08-18 PROCEDURE — 3044F HG A1C LEVEL LT 7.0%: CPT | Performed by: INTERNAL MEDICINE

## 2025-08-18 PROCEDURE — 1159F MED LIST DOCD IN RCRD: CPT | Performed by: INTERNAL MEDICINE

## 2025-08-18 PROCEDURE — 3074F SYST BP LT 130 MM HG: CPT | Performed by: INTERNAL MEDICINE

## 2025-08-18 RX ORDER — INSULIN DEGLUDEC 100 U/ML
19 INJECTION, SOLUTION SUBCUTANEOUS NIGHTLY
Qty: 15 ML | Refills: 5 | Status: SHIPPED | OUTPATIENT
Start: 2025-08-18 | End: 2026-02-14

## 2025-08-18 RX ORDER — INSULIN DEGLUDEC 100 U/ML
19 INJECTION, SOLUTION SUBCUTANEOUS NIGHTLY
Qty: 15 ML | Refills: 5 | Status: SHIPPED | OUTPATIENT
Start: 2025-08-18 | End: 2025-08-18

## 2025-08-18 RX ORDER — DAPAGLIFLOZIN 10 MG/1
10 TABLET, FILM COATED ORAL DAILY
Qty: 90 TABLET | Refills: 1 | Status: SHIPPED | OUTPATIENT
Start: 2025-08-18 | End: 2026-02-14

## 2025-08-18 RX ORDER — DAPAGLIFLOZIN 10 MG/1
10 TABLET, FILM COATED ORAL DAILY
Qty: 90 TABLET | Refills: 1 | Status: SHIPPED | OUTPATIENT
Start: 2025-08-18 | End: 2025-08-18

## 2025-08-18 RX ORDER — SEMAGLUTIDE 2.68 MG/ML
2 INJECTION, SOLUTION SUBCUTANEOUS WEEKLY
Qty: 9 ML | Refills: 3 | Status: SHIPPED | OUTPATIENT
Start: 2025-08-18 | End: 2026-02-14

## 2025-08-18 ASSESSMENT — PATIENT HEALTH QUESTIONNAIRE - PHQ9
2. FEELING DOWN, DEPRESSED OR HOPELESS: SEVERAL DAYS
1. LITTLE INTEREST OR PLEASURE IN DOING THINGS: SEVERAL DAYS
SUM OF ALL RESPONSES TO PHQ9 QUESTIONS 1 AND 2: 2

## 2025-08-18 ASSESSMENT — ENCOUNTER SYMPTOMS
SHORTNESS OF BREATH: 0
ARTHRALGIAS: 1

## 2025-08-18 ASSESSMENT — COLUMBIA-SUICIDE SEVERITY RATING SCALE - C-SSRS
1. IN THE PAST MONTH, HAVE YOU WISHED YOU WERE DEAD OR WISHED YOU COULD GO TO SLEEP AND NOT WAKE UP?: NO
2. HAVE YOU ACTUALLY HAD ANY THOUGHTS OF KILLING YOURSELF?: NO
6. HAVE YOU EVER DONE ANYTHING, STARTED TO DO ANYTHING, OR PREPARED TO DO ANYTHING TO END YOUR LIFE?: NO

## 2025-08-22 LAB
ATRIAL RATE: 0 BPM
PR INTERVAL: 176 MS
Q ONSET: 249 MS
QRS COUNT: 9 BEATS
QRS DURATION: 108 MS
QT INTERVAL: 507 MS
QTC CALCULATION(BAZETT): 477 MS
QTC FREDERICIA: 486 MS
R AXIS: 15 DEGREES
T AXIS: 0 DEGREES
T OFFSET: 503 MS
VENTRICULAR RATE: 53 BPM

## 2025-08-29 ENCOUNTER — TELEPHONE (OUTPATIENT)
Dept: CARDIOLOGY | Facility: HOSPITAL | Age: 75
End: 2025-08-29
Payer: MEDICARE

## 2025-08-29 ASSESSMENT — ENCOUNTER SYMPTOMS
DECREASED APPETITE: 0
GASTROINTESTINAL NEGATIVE: 1
RESPIRATORY NEGATIVE: 1
IRREGULAR HEARTBEAT: 0
FALLS: 0
PALPITATIONS: 0
COUGH: 0
MEMORY LOSS: 0
LIGHT-HEADEDNESS: 0
CONSTITUTIONAL NEGATIVE: 1
SYNCOPE: 0
BLURRED VISION: 0
DEPRESSION: 0
FOCAL WEAKNESS: 0
ORTHOPNEA: 0
DYSPNEA ON EXERTION: 0
SHORTNESS OF BREATH: 0

## 2025-09-04 ENCOUNTER — OFFICE VISIT (OUTPATIENT)
Dept: CARDIOLOGY | Facility: HOSPITAL | Age: 75
End: 2025-09-04
Payer: MEDICARE

## 2025-09-04 VITALS
HEIGHT: 70 IN | BODY MASS INDEX: 28.77 KG/M2 | OXYGEN SATURATION: 98 % | SYSTOLIC BLOOD PRESSURE: 124 MMHG | WEIGHT: 201 LBS | DIASTOLIC BLOOD PRESSURE: 78 MMHG | HEART RATE: 65 BPM

## 2025-09-04 DIAGNOSIS — E78.5 DYSLIPIDEMIA: Primary | ICD-10-CM

## 2025-09-04 DIAGNOSIS — I48.11 LONGSTANDING PERSISTENT ATRIAL FIBRILLATION (MULTI): ICD-10-CM

## 2025-09-04 DIAGNOSIS — I25.10 ATHEROSCLEROSIS OF NATIVE CORONARY ARTERY OF NATIVE HEART WITHOUT ANGINA PECTORIS: ICD-10-CM

## 2025-09-04 DIAGNOSIS — G47.33 OSA (OBSTRUCTIVE SLEEP APNEA): ICD-10-CM

## 2025-09-04 DIAGNOSIS — I10 BENIGN ESSENTIAL HYPERTENSION: ICD-10-CM

## 2025-09-04 DIAGNOSIS — I48.0 PAROXYSMAL ATRIAL FIBRILLATION (MULTI): ICD-10-CM

## 2025-09-04 DIAGNOSIS — I50.32 CHRONIC DIASTOLIC HEART FAILURE, NYHA CLASS 2: ICD-10-CM

## 2025-09-04 DIAGNOSIS — I50.32 CHRONIC DIASTOLIC HEART FAILURE: ICD-10-CM

## 2025-09-04 PROCEDURE — 3078F DIAST BP <80 MM HG: CPT | Performed by: NURSE PRACTITIONER

## 2025-09-04 PROCEDURE — 1160F RVW MEDS BY RX/DR IN RCRD: CPT | Performed by: NURSE PRACTITIONER

## 2025-09-04 PROCEDURE — 3044F HG A1C LEVEL LT 7.0%: CPT | Performed by: NURSE PRACTITIONER

## 2025-09-04 PROCEDURE — 99212 OFFICE O/P EST SF 10 MIN: CPT | Performed by: NURSE PRACTITIONER

## 2025-09-04 PROCEDURE — 3074F SYST BP LT 130 MM HG: CPT | Performed by: NURSE PRACTITIONER

## 2025-09-04 PROCEDURE — 1159F MED LIST DOCD IN RCRD: CPT | Performed by: NURSE PRACTITIONER

## 2025-09-04 PROCEDURE — 99214 OFFICE O/P EST MOD 30 MIN: CPT | Performed by: NURSE PRACTITIONER

## 2025-10-09 ENCOUNTER — APPOINTMENT (OUTPATIENT)
Dept: PRIMARY CARE | Facility: CLINIC | Age: 75
End: 2025-10-09
Payer: MEDICARE

## 2025-12-23 ENCOUNTER — APPOINTMENT (OUTPATIENT)
Dept: PRIMARY CARE | Facility: CLINIC | Age: 75
End: 2025-12-23
Payer: MEDICARE

## 2026-01-08 ENCOUNTER — APPOINTMENT (OUTPATIENT)
Dept: PRIMARY CARE | Facility: CLINIC | Age: 76
End: 2026-01-08
Payer: MEDICARE

## 2026-02-19 ENCOUNTER — APPOINTMENT (OUTPATIENT)
Dept: ENDOCRINOLOGY | Facility: CLINIC | Age: 76
End: 2026-02-19
Payer: MEDICARE